# Patient Record
Sex: FEMALE | Race: WHITE | NOT HISPANIC OR LATINO
[De-identification: names, ages, dates, MRNs, and addresses within clinical notes are randomized per-mention and may not be internally consistent; named-entity substitution may affect disease eponyms.]

---

## 2017-04-18 ENCOUNTER — APPOINTMENT (OUTPATIENT)
Dept: SURGICAL ONCOLOGY | Facility: CLINIC | Age: 77
End: 2017-04-18

## 2017-04-18 VITALS
HEIGHT: 60 IN | OXYGEN SATURATION: 98 % | SYSTOLIC BLOOD PRESSURE: 189 MMHG | WEIGHT: 147 LBS | HEART RATE: 66 BPM | BODY MASS INDEX: 28.86 KG/M2 | DIASTOLIC BLOOD PRESSURE: 92 MMHG

## 2017-04-18 DIAGNOSIS — C50.911 MALIGNANT NEOPLASM OF UNSPECIFIED SITE OF RIGHT FEMALE BREAST: ICD-10-CM

## 2018-04-17 ENCOUNTER — APPOINTMENT (OUTPATIENT)
Dept: SURGICAL ONCOLOGY | Facility: CLINIC | Age: 78
End: 2018-04-17

## 2018-04-18 ENCOUNTER — RESULT REVIEW (OUTPATIENT)
Age: 78
End: 2018-04-18

## 2019-03-25 ENCOUNTER — RX RENEWAL (OUTPATIENT)
Age: 79
End: 2019-03-25

## 2019-03-25 DIAGNOSIS — E03.9 HYPOTHYROIDISM, UNSPECIFIED: ICD-10-CM

## 2019-03-26 ENCOUNTER — RX RENEWAL (OUTPATIENT)
Age: 79
End: 2019-03-26

## 2019-06-17 ENCOUNTER — APPOINTMENT (OUTPATIENT)
Dept: CARDIOLOGY | Facility: CLINIC | Age: 79
End: 2019-06-17

## 2019-06-28 ENCOUNTER — RX RENEWAL (OUTPATIENT)
Age: 79
End: 2019-06-28

## 2019-11-19 ENCOUNTER — EMERGENCY (EMERGENCY)
Facility: HOSPITAL | Age: 79
LOS: 1 days | Discharge: HOME | End: 2019-11-19
Attending: EMERGENCY MEDICINE
Payer: MEDICARE

## 2019-11-19 VITALS
OXYGEN SATURATION: 99 % | HEART RATE: 74 BPM | SYSTOLIC BLOOD PRESSURE: 174 MMHG | DIASTOLIC BLOOD PRESSURE: 80 MMHG | RESPIRATION RATE: 16 BRPM | HEIGHT: 60 IN | TEMPERATURE: 98 F | WEIGHT: 145.06 LBS

## 2019-11-19 VITALS
SYSTOLIC BLOOD PRESSURE: 182 MMHG | HEART RATE: 57 BPM | RESPIRATION RATE: 18 BRPM | TEMPERATURE: 98 F | DIASTOLIC BLOOD PRESSURE: 76 MMHG | OXYGEN SATURATION: 99 %

## 2019-11-19 LAB
ALBUMIN SERPL ELPH-MCNC: 4.2 G/DL — SIGNIFICANT CHANGE UP (ref 3.3–5)
ALP SERPL-CCNC: 83 U/L — SIGNIFICANT CHANGE UP (ref 40–120)
ALT FLD-CCNC: 30 U/L — SIGNIFICANT CHANGE UP (ref 10–45)
ANION GAP SERPL CALC-SCNC: 12 MMOL/L — SIGNIFICANT CHANGE UP (ref 5–17)
AST SERPL-CCNC: 29 U/L — SIGNIFICANT CHANGE UP (ref 10–40)
BASOPHILS # BLD AUTO: 0.03 K/UL — SIGNIFICANT CHANGE UP (ref 0–0.2)
BASOPHILS NFR BLD AUTO: 0.4 % — SIGNIFICANT CHANGE UP (ref 0–2)
BILIRUB SERPL-MCNC: 0.4 MG/DL — SIGNIFICANT CHANGE UP (ref 0.2–1.2)
BUN SERPL-MCNC: 9 MG/DL — SIGNIFICANT CHANGE UP (ref 7–23)
CALCIUM SERPL-MCNC: 9.5 MG/DL — SIGNIFICANT CHANGE UP (ref 8.4–10.5)
CHLORIDE SERPL-SCNC: 97 MMOL/L — SIGNIFICANT CHANGE UP (ref 96–108)
CO2 SERPL-SCNC: 24 MMOL/L — SIGNIFICANT CHANGE UP (ref 22–31)
CREAT SERPL-MCNC: 0.7 MG/DL — SIGNIFICANT CHANGE UP (ref 0.5–1.3)
EOSINOPHIL # BLD AUTO: 0.06 K/UL — SIGNIFICANT CHANGE UP (ref 0–0.5)
EOSINOPHIL NFR BLD AUTO: 0.8 % — SIGNIFICANT CHANGE UP (ref 0–6)
GLUCOSE SERPL-MCNC: 139 MG/DL — HIGH (ref 70–99)
HCT VFR BLD CALC: 40.6 % — SIGNIFICANT CHANGE UP (ref 34.5–45)
HGB BLD-MCNC: 14.1 G/DL — SIGNIFICANT CHANGE UP (ref 11.5–15.5)
IMM GRANULOCYTES NFR BLD AUTO: 0.3 % — SIGNIFICANT CHANGE UP (ref 0–1.5)
LYMPHOCYTES # BLD AUTO: 1.37 K/UL — SIGNIFICANT CHANGE UP (ref 1–3.3)
LYMPHOCYTES # BLD AUTO: 18.2 % — SIGNIFICANT CHANGE UP (ref 13–44)
MCHC RBC-ENTMCNC: 31.6 PG — SIGNIFICANT CHANGE UP (ref 27–34)
MCHC RBC-ENTMCNC: 34.7 GM/DL — SIGNIFICANT CHANGE UP (ref 32–36)
MCV RBC AUTO: 91 FL — SIGNIFICANT CHANGE UP (ref 80–100)
MONOCYTES # BLD AUTO: 0.63 K/UL — SIGNIFICANT CHANGE UP (ref 0–0.9)
MONOCYTES NFR BLD AUTO: 8.4 % — SIGNIFICANT CHANGE UP (ref 2–14)
NEUTROPHILS # BLD AUTO: 5.43 K/UL — SIGNIFICANT CHANGE UP (ref 1.8–7.4)
NEUTROPHILS NFR BLD AUTO: 71.9 % — SIGNIFICANT CHANGE UP (ref 43–77)
NRBC # BLD: 0 /100 WBCS — SIGNIFICANT CHANGE UP (ref 0–0)
PLATELET # BLD AUTO: 382 K/UL — SIGNIFICANT CHANGE UP (ref 150–400)
POTASSIUM SERPL-MCNC: 4.4 MMOL/L — SIGNIFICANT CHANGE UP (ref 3.5–5.3)
POTASSIUM SERPL-SCNC: 4.4 MMOL/L — SIGNIFICANT CHANGE UP (ref 3.5–5.3)
PROT SERPL-MCNC: 7.3 G/DL — SIGNIFICANT CHANGE UP (ref 6–8.3)
RBC # BLD: 4.46 M/UL — SIGNIFICANT CHANGE UP (ref 3.8–5.2)
RBC # FLD: 12.6 % — SIGNIFICANT CHANGE UP (ref 10.3–14.5)
SODIUM SERPL-SCNC: 133 MMOL/L — LOW (ref 135–145)
TROPONIN T, HIGH SENSITIVITY RESULT: <6 NG/L — SIGNIFICANT CHANGE UP (ref 0–51)
WBC # BLD: 7.54 K/UL — SIGNIFICANT CHANGE UP (ref 3.8–10.5)
WBC # FLD AUTO: 7.54 K/UL — SIGNIFICANT CHANGE UP (ref 3.8–10.5)

## 2019-11-19 PROCEDURE — 99285 EMERGENCY DEPT VISIT HI MDM: CPT

## 2019-11-19 PROCEDURE — 99283 EMERGENCY DEPT VISIT LOW MDM: CPT | Mod: 25

## 2019-11-19 PROCEDURE — 93005 ELECTROCARDIOGRAM TRACING: CPT

## 2019-11-19 PROCEDURE — 80053 COMPREHEN METABOLIC PANEL: CPT

## 2019-11-19 PROCEDURE — 84484 ASSAY OF TROPONIN QUANT: CPT

## 2019-11-19 PROCEDURE — 71046 X-RAY EXAM CHEST 2 VIEWS: CPT

## 2019-11-19 PROCEDURE — 85027 COMPLETE CBC AUTOMATED: CPT

## 2019-11-19 PROCEDURE — 82962 GLUCOSE BLOOD TEST: CPT

## 2019-11-19 PROCEDURE — 71046 X-RAY EXAM CHEST 2 VIEWS: CPT | Mod: 26

## 2019-11-19 RX ORDER — DEXAMETHASONE 0.5 MG/5ML
10 ELIXIR ORAL ONCE
Refills: 0 | Status: DISCONTINUED | OUTPATIENT
Start: 2019-11-19 | End: 2019-11-19

## 2019-11-19 RX ORDER — LIDOCAINE 4 G/100G
10 CREAM TOPICAL ONCE
Refills: 0 | Status: COMPLETED | OUTPATIENT
Start: 2019-11-19 | End: 2019-11-19

## 2019-11-19 RX ORDER — DEXAMETHASONE 0.5 MG/5ML
10 ELIXIR ORAL ONCE
Refills: 0 | Status: COMPLETED | OUTPATIENT
Start: 2019-11-19 | End: 2019-11-19

## 2019-11-19 RX ADMIN — LIDOCAINE 10 MILLILITER(S): 4 CREAM TOPICAL at 19:39

## 2019-11-19 RX ADMIN — Medication 30 MILLILITER(S): at 19:39

## 2019-11-19 RX ADMIN — Medication 10 MILLIGRAM(S): at 19:26

## 2019-11-19 NOTE — ED PROVIDER NOTE - ATTENDING CONTRIBUTION TO CARE
Pt with reports of pain of throat when swallowing, able to swallow secretions, no dyspnea, fever, vomiting.  NO stridor, no pharyngitis, clear lungs.

## 2019-11-19 NOTE — ED PROVIDER NOTE - NSFOLLOWUPINSTRUCTIONS_ED_ALL_ED_FT
-Follow-up with your Primary Care Doctor in 1-2 days.  -Return to the Emergency Department for any worsening or concerning symptoms such as fevers, severe pain, trouble breathing, weakness or lightheadedness. -Follow-up with your Primary Care Doctor in 1-2 days.  -Return to the Emergency Department for any worsening or concerning symptoms such as fevers, severe pain, trouble breathing, weakness or lightheadedness.    1. Recommend start Maalox 30ml 3-4 times a day only as needed for discomfort of throat/esophagus/stomach.  2. Pepcid 20mg twice a day for at least one month to reduce stomach acid and reflux.  3. Call GI tomorrow for appointment to consider endoscopy for your symptoms of pain of upper esophagus worse with swallowing.  4. Return if vomiting, worsening pain or other concerns.

## 2019-11-19 NOTE — ED PROVIDER NOTE - PHYSICAL EXAMINATION
Gen: No acute distress, alert, cooperative  HENT: Normocephalic, atraumatic. mild erythema posterior throat with no exudate swelling or collections. Ears clear, TMSnormal.   Eyes: PERRL, EOMI    Resp: CTAB, non-labored, no wheeze  CV: rrr, no murmur  Abd: soft, NTND, no rebound or guarding  MSK: No CVAT bilaterally, no midline ttp  Skin: warm and dry  Neuro: AOx3, speech is fluent and appropriate, no focal deficits  Psych: Normal affect

## 2019-11-19 NOTE — ED ADULT NURSE NOTE - OBJECTIVE STATEMENT
79 y.o. Female presents to the ED accompanied by c/o SOB since 6am this morning. Hx anxiety, hypothyroidism. A&Ox3. Ambulatory. Pt states feeling difficulty swallowing. Airway intact. Verbalizing clearly. Denies cough. Pt reports family member recently bronchitis and it started out with a sore throat as well. Lungs clear b/l. Denies CP, SOB, N/V/D, urinary/bowel complications, fever/chills. Well-appearing. Pt is in no current distress. Comfort and safety provided. Will continue to monitor. Dr. Mackenzie at bedside for assessment.

## 2019-11-19 NOTE — ED PROVIDER NOTE - PATIENT PORTAL LINK FT
You can access the FollowMyHealth Patient Portal offered by [facility] by registering at the following website: [email or web address]. By joining [facility]’s FollowMyHealth portal, you will also be able to view your health information using other applications (apps) compatible with our system.

## 2019-11-19 NOTE — ED ADULT NURSE REASSESSMENT NOTE - NS ED NURSE REASSESS COMMENT FT1
Patient received from HARRIS Burnette. Patient a&ox3, no acute distress, resp nonlabored, resting comfortably in bed with family at bedside. Denies headache, dizziness, chest pain, palpitations, SOB, abd pain, N/V/D, urinary symptoms, fevers, chills, weakness at this time. Patient awaiting discharge. Pt placed in position of comfort. Pt educated on call bell system and provided call bell. Non-skid socks on, bed in lowest position, wheels locked, appropriate side rails raised. Pt denies needs at this time.

## 2019-11-19 NOTE — ED PROVIDER NOTE - CLINICAL SUMMARY MEDICAL DECISION MAKING FREE TEXT BOX
78yo F hx hypothyroid, anxiety, asthma(rescue inhaler) presenting with sore throat since this morning when she woke up. Mild erythema posterior throat with some mild chest discomfort. Suspect viral infection.

## 2019-11-19 NOTE — ED PROVIDER NOTE - RAPID ASSESSMENT
**Pt seen in waiting room by Mana Jenkins ([id number]), documentation completed by Bob Rosenthal. Pt to be sent to main ED for further evaluation - all orders placed to be followed by MD in the main ED**     79 year old female with pmhx hypothyroidism, anxiety, asthma and pshx , s/p bunionectomy presents to the ED c/o throat pain this morning. +chest discomfort, throat pain, chills. Called PMD afterwards and was subsequently referred to [facility] ED for evaluation. Denies fever, cough, wheezing, SOB, lower extremity swelling. Denies recent travel. Denies smoking. Pt has had this type of discomfort once before and was seen at [facility] ED, diagnosed with pharyngitis and given Decadron. Notes that her daughter has been sick recently with similar symptoms. Took medicine for anxiety this morning at 0600 but experienced dizziness afterwards and followed it up with Meclizine. Currently on Levothyroxine.

## 2019-11-19 NOTE — ED PROVIDER NOTE - PROGRESS NOTE DETAILS
Discussed return precautions Dr. Savage Note: pt with obvious proximal esophageal discomfort, has GI doc, spoke to pt and daughter about f/u GI for endoscopy and start maalox and pepcid at home.

## 2019-11-19 NOTE — ED PROVIDER NOTE - OBJECTIVE STATEMENT
80yo F hx hypothyroid, anxiety, asthma(rescue inhaler) presenting with sore throat since this morning when she woke up. Says it is mildly painful when she swallows and feels like she doesn't have enough secretions. No difficulty swallowing, still eating and drinking without issue. Denies sob, back pain, headache, abdominal pain, congestion, runny nose. Notes some mild chest discomfort which has resolved. + sick contacts at home.

## 2019-12-18 ENCOUNTER — RX RENEWAL (OUTPATIENT)
Age: 79
End: 2019-12-18

## 2020-02-17 ENCOUNTER — RX RENEWAL (OUTPATIENT)
Age: 80
End: 2020-02-17

## 2020-02-17 RX ORDER — LEVOTHYROXINE SODIUM 0.05 MG/1
50 TABLET ORAL
Qty: 45 | Refills: 0 | Status: ACTIVE | COMMUNITY
Start: 2019-03-26 | End: 1900-01-01

## 2020-02-17 RX ORDER — LEVOTHYROXINE SODIUM 0.07 MG/1
75 TABLET ORAL
Qty: 45 | Refills: 0 | Status: ACTIVE | COMMUNITY
Start: 2019-03-26 | End: 1900-01-01

## 2021-07-20 ENCOUNTER — EMERGENCY (EMERGENCY)
Facility: HOSPITAL | Age: 81
LOS: 1 days | Discharge: HOME | End: 2021-07-20
Attending: EMERGENCY MEDICINE
Payer: MEDICARE

## 2021-07-20 VITALS
SYSTOLIC BLOOD PRESSURE: 150 MMHG | HEIGHT: 60 IN | DIASTOLIC BLOOD PRESSURE: 80 MMHG | RESPIRATION RATE: 17 BRPM | OXYGEN SATURATION: 99 % | HEART RATE: 61 BPM | WEIGHT: 145.06 LBS | TEMPERATURE: 98 F

## 2021-07-20 VITALS
DIASTOLIC BLOOD PRESSURE: 79 MMHG | SYSTOLIC BLOOD PRESSURE: 181 MMHG | OXYGEN SATURATION: 96 % | HEART RATE: 65 BPM | TEMPERATURE: 98 F | RESPIRATION RATE: 18 BRPM

## 2021-07-20 LAB
ALBUMIN SERPL ELPH-MCNC: 4.4 G/DL — SIGNIFICANT CHANGE UP (ref 3.3–5)
ALP SERPL-CCNC: 85 U/L — SIGNIFICANT CHANGE UP (ref 40–120)
ALT FLD-CCNC: 30 U/L — SIGNIFICANT CHANGE UP (ref 10–45)
ANION GAP SERPL CALC-SCNC: 13 MMOL/L — SIGNIFICANT CHANGE UP (ref 5–17)
APPEARANCE UR: ABNORMAL
AST SERPL-CCNC: 24 U/L — SIGNIFICANT CHANGE UP (ref 10–40)
BASOPHILS # BLD AUTO: 0.03 K/UL — SIGNIFICANT CHANGE UP (ref 0–0.2)
BASOPHILS NFR BLD AUTO: 0.3 % — SIGNIFICANT CHANGE UP (ref 0–2)
BILIRUB SERPL-MCNC: 0.4 MG/DL — SIGNIFICANT CHANGE UP (ref 0.2–1.2)
BILIRUB UR-MCNC: NEGATIVE — SIGNIFICANT CHANGE UP
BUN SERPL-MCNC: 10 MG/DL — SIGNIFICANT CHANGE UP (ref 7–23)
CALCIUM SERPL-MCNC: 9.2 MG/DL — SIGNIFICANT CHANGE UP (ref 8.4–10.5)
CHLORIDE SERPL-SCNC: 96 MMOL/L — SIGNIFICANT CHANGE UP (ref 96–108)
CO2 SERPL-SCNC: 23 MMOL/L — SIGNIFICANT CHANGE UP (ref 22–31)
COLOR SPEC: YELLOW — SIGNIFICANT CHANGE UP
CREAT SERPL-MCNC: 0.82 MG/DL — SIGNIFICANT CHANGE UP (ref 0.5–1.3)
DIFF PNL FLD: NEGATIVE — SIGNIFICANT CHANGE UP
EOSINOPHIL # BLD AUTO: 0.05 K/UL — SIGNIFICANT CHANGE UP (ref 0–0.5)
EOSINOPHIL NFR BLD AUTO: 0.5 % — SIGNIFICANT CHANGE UP (ref 0–6)
GLUCOSE SERPL-MCNC: 128 MG/DL — HIGH (ref 70–99)
GLUCOSE UR QL: NEGATIVE — SIGNIFICANT CHANGE UP
HCT VFR BLD CALC: 41.9 % — SIGNIFICANT CHANGE UP (ref 34.5–45)
HGB BLD-MCNC: 14.3 G/DL — SIGNIFICANT CHANGE UP (ref 11.5–15.5)
IMM GRANULOCYTES NFR BLD AUTO: 0.5 % — SIGNIFICANT CHANGE UP (ref 0–1.5)
KETONES UR-MCNC: NEGATIVE — SIGNIFICANT CHANGE UP
LEUKOCYTE ESTERASE UR-ACNC: ABNORMAL
LIDOCAIN IGE QN: 40 U/L — SIGNIFICANT CHANGE UP (ref 7–60)
LYMPHOCYTES # BLD AUTO: 1 K/UL — SIGNIFICANT CHANGE UP (ref 1–3.3)
LYMPHOCYTES # BLD AUTO: 9.1 % — LOW (ref 13–44)
MCHC RBC-ENTMCNC: 31.2 PG — SIGNIFICANT CHANGE UP (ref 27–34)
MCHC RBC-ENTMCNC: 34.1 GM/DL — SIGNIFICANT CHANGE UP (ref 32–36)
MCV RBC AUTO: 91.3 FL — SIGNIFICANT CHANGE UP (ref 80–100)
MONOCYTES # BLD AUTO: 0.83 K/UL — SIGNIFICANT CHANGE UP (ref 0–0.9)
MONOCYTES NFR BLD AUTO: 7.5 % — SIGNIFICANT CHANGE UP (ref 2–14)
NEUTROPHILS # BLD AUTO: 9.07 K/UL — HIGH (ref 1.8–7.4)
NEUTROPHILS NFR BLD AUTO: 82.1 % — HIGH (ref 43–77)
NITRITE UR-MCNC: NEGATIVE — SIGNIFICANT CHANGE UP
NRBC # BLD: 0 /100 WBCS — SIGNIFICANT CHANGE UP (ref 0–0)
PH UR: 6 — SIGNIFICANT CHANGE UP (ref 5–8)
PLATELET # BLD AUTO: 400 K/UL — SIGNIFICANT CHANGE UP (ref 150–400)
POTASSIUM SERPL-MCNC: 4 MMOL/L — SIGNIFICANT CHANGE UP (ref 3.5–5.3)
POTASSIUM SERPL-SCNC: 4 MMOL/L — SIGNIFICANT CHANGE UP (ref 3.5–5.3)
PROT SERPL-MCNC: 7.2 G/DL — SIGNIFICANT CHANGE UP (ref 6–8.3)
PROT UR-MCNC: ABNORMAL
RBC # BLD: 4.59 M/UL — SIGNIFICANT CHANGE UP (ref 3.8–5.2)
RBC # FLD: 12 % — SIGNIFICANT CHANGE UP (ref 10.3–14.5)
SODIUM SERPL-SCNC: 132 MMOL/L — LOW (ref 135–145)
SP GR SPEC: 1.02 — SIGNIFICANT CHANGE UP (ref 1.01–1.02)
UROBILINOGEN FLD QL: NEGATIVE — SIGNIFICANT CHANGE UP
WBC # BLD: 11.03 K/UL — HIGH (ref 3.8–10.5)
WBC # FLD AUTO: 11.03 K/UL — HIGH (ref 3.8–10.5)

## 2021-07-20 PROCEDURE — 99285 EMERGENCY DEPT VISIT HI MDM: CPT

## 2021-07-20 PROCEDURE — 96374 THER/PROPH/DIAG INJ IV PUSH: CPT | Mod: XU

## 2021-07-20 PROCEDURE — 74177 CT ABD & PELVIS W/CONTRAST: CPT | Mod: 26,MA

## 2021-07-20 PROCEDURE — 74177 CT ABD & PELVIS W/CONTRAST: CPT

## 2021-07-20 PROCEDURE — 83690 ASSAY OF LIPASE: CPT

## 2021-07-20 PROCEDURE — 85025 COMPLETE CBC W/AUTO DIFF WBC: CPT

## 2021-07-20 PROCEDURE — 99284 EMERGENCY DEPT VISIT MOD MDM: CPT | Mod: 25

## 2021-07-20 PROCEDURE — 81001 URINALYSIS AUTO W/SCOPE: CPT

## 2021-07-20 PROCEDURE — 70450 CT HEAD/BRAIN W/O DYE: CPT | Mod: 26,MA

## 2021-07-20 PROCEDURE — 80053 COMPREHEN METABOLIC PANEL: CPT

## 2021-07-20 PROCEDURE — 70450 CT HEAD/BRAIN W/O DYE: CPT

## 2021-07-20 RX ORDER — ACETAMINOPHEN 500 MG
650 TABLET ORAL ONCE
Refills: 0 | Status: COMPLETED | OUTPATIENT
Start: 2021-07-20 | End: 2021-07-20

## 2021-07-20 RX ORDER — CEFUROXIME AXETIL 250 MG
500 TABLET ORAL ONCE
Refills: 0 | Status: COMPLETED | OUTPATIENT
Start: 2021-07-20 | End: 2021-07-20

## 2021-07-20 RX ORDER — CEFUROXIME AXETIL 250 MG
1 TABLET ORAL
Qty: 10 | Refills: 0
Start: 2021-07-20 | End: 2021-07-24

## 2021-07-20 RX ORDER — ONDANSETRON 8 MG/1
4 TABLET, FILM COATED ORAL ONCE
Refills: 0 | Status: COMPLETED | OUTPATIENT
Start: 2021-07-20 | End: 2021-07-20

## 2021-07-20 RX ADMIN — Medication 500 MILLIGRAM(S): at 20:34

## 2021-07-20 RX ADMIN — ONDANSETRON 4 MILLIGRAM(S): 8 TABLET, FILM COATED ORAL at 17:50

## 2021-07-20 RX ADMIN — Medication 650 MILLIGRAM(S): at 21:30

## 2021-07-20 NOTE — ED PROVIDER NOTE - PATIENT PORTAL LINK FT
You can access the FollowMyHealth Patient Portal offered by [facility] by registering at the following website: [email or web address]. By joining [employer]’s FollowMyHealth portal, you will also be able to view your health information using other applications (apps) compatible with our system.

## 2021-07-20 NOTE — ED ADULT TRIAGE NOTE - CHIEF COMPLAINT QUOTE
trip and fall , missed a step off the curb . fell onto back of head. no LOC, no blood thinners. pt is aaox4.

## 2021-07-20 NOTE — ED ADULT NURSE NOTE - OBJECTIVE STATEMENT
81y f pt arrived via ems; emt reports pt shopping went to step up, grab a sign and fell backwards; hit back of head; pt aox3; no resp distress; no loc; no vision changes; pt usually ambulates without assist with steady gait; pt also c/o abd pain and distention; states went to md and was scheduled for ct scan of abd on monday; has plenty of gas but no bm; pt has taken 2 doses of miralax with no relief; pt abd + distended; nontender; + nausea; no vomiting; lauren (emt) at bedside; safety/comfort maintained

## 2021-07-20 NOTE — ED PROVIDER NOTE - PHYSICAL EXAMINATION
Gen - NAD; well-appearing; A+Ox3   HEENT - +hematoma to R posterior scalp without skin breakdown, +TTP; EOMI  Neck - supple, no c-spine tenderness, FROM  Resp - CTAB  CV -  RRR  Abd - soft, +distension, no appreciable tenderness; no guarding or rebound  Back - no midline tenderness  MSK - 5/5 strength and FROM b/l UE and LE  Extrem - no LE edema/erythema/tenderness  Neuro - no focal motor or sensation deficits

## 2021-07-20 NOTE — ED PROVIDER NOTE - CLINICAL SUMMARY MEDICAL DECISION MAKING FREE TEXT BOX
Attending MD Matson:  81F presenting for evaluation of posterior head pain after fall from standing backwards, no LOC. Cervical spine cleared clinically of fracture without need for imaging according to [facility] Criteria. Exam with right occipital scalp hematoma without obvious laceration. Nonfocal neuro exam. No other trauma on exam. Pt also endorses nausea and no BMs x days, abdomen distended but nontender, no focal ttp, hyperactive BS. Plan for CT a/p to r/o SBO, CT head to ro ICH       *The above represents an initial assessment/impression. Please refer to progress notes for potential changes in patient clinical course*

## 2021-07-20 NOTE — ED PROVIDER NOTE - CARE PLAN
Principal Discharge DX:	Constipation  Secondary Diagnosis:	Fall   Principal Discharge DX:	Constipation  Secondary Diagnosis:	UTI (urinary tract infection)  Secondary Diagnosis:	Closed head injury, initial encounter

## 2021-07-20 NOTE — ED PROVIDER NOTE - OBJECTIVE STATEMENT
81 year old female with history of HTN, hypothyroidism, GERD presenting s/p trip and fall with posterior head pain. States she was stepping up onto curb when the pole she hung onto fell backwards, causing her to fall back and hit the back of her head to ground, denies LOC/AC use, does report +pain to back of head with a "bump". Also reports that in past few days has had abdominal distension, was rx'd miralax by PMD which she used last night and this AM without improvement. Denies CP, SOB, vomiting, cough, weakness, numbness, bleeding.     Dr. Jade PMD

## 2021-07-20 NOTE — ED PROVIDER NOTE - NSFOLLOWUPINSTRUCTIONS_ED_ALL_ED_FT
You were seen in the Emergency Department for: fall, constipation    You were found to have a Urinary Tract Infection (UTI)    You were prescribed to the pharmacy: Ceftin (antibiotic)  Please follow the instructions on the container/label and ask your pharmacist for any questions/concerns.    Please also continue to take Miralax. You can purchase it over the counterl    For pain/fever, you may take Tylenol (acetaminophen) 650 mg every 6 hours, OR Advil (ibuprofen) 600 mg every 8 hours.    Please follow up with your primary physician as discussed. If you do not have a primary physician or specialist of your needs, please call [phone] to find one convenient for you. At this number you will be able to locate a provider who accepts your insurance, as well as locate the right specialist for your needs.    You should return to the Emergency Department if you feel any new/worsening/persistent symptoms including but not limited to: chest pain, difficulty breathing, loss of consciousness, bleeding, uncontrolled pain, numbness/weakness of a body part

## 2021-07-20 NOTE — ED PROVIDER NOTE - ATTENDING CONTRIBUTION TO CARE
Attending MD Matson:  I personally have seen and examined this patient.  Resident note reviewed and agree on plan of care and except where noted.  See HPI, PE, and MDM for details.

## 2021-07-29 ENCOUNTER — APPOINTMENT (OUTPATIENT)
Dept: GASTROENTEROLOGY | Facility: CLINIC | Age: 81
End: 2021-07-29

## 2022-10-01 ENCOUNTER — INPATIENT (INPATIENT)
Facility: HOSPITAL | Age: 82
LOS: 2 days | Discharge: HOME | DRG: 381 | End: 2022-10-04
Attending: HOSPITALIST | Admitting: HOSPITALIST
Payer: MEDICARE

## 2022-10-01 VITALS
TEMPERATURE: 97 F | SYSTOLIC BLOOD PRESSURE: 114 MMHG | OXYGEN SATURATION: 98 % | HEIGHT: 60 IN | DIASTOLIC BLOOD PRESSURE: 76 MMHG | HEART RATE: 83 BPM | WEIGHT: 149.91 LBS | RESPIRATION RATE: 20 BRPM

## 2022-10-01 DIAGNOSIS — K08.409 PARTIAL LOSS OF TEETH, UNSPECIFIED CAUSE, UNSPECIFIED CLASS: ICD-10-CM

## 2022-10-01 DIAGNOSIS — D72.829 ELEVATED WHITE BLOOD CELL COUNT, UNSPECIFIED: ICD-10-CM

## 2022-10-01 DIAGNOSIS — E78.5 HYPERLIPIDEMIA, UNSPECIFIED: ICD-10-CM

## 2022-10-01 DIAGNOSIS — Z29.9 ENCOUNTER FOR PROPHYLACTIC MEASURES, UNSPECIFIED: ICD-10-CM

## 2022-10-01 DIAGNOSIS — E03.9 HYPOTHYROIDISM, UNSPECIFIED: ICD-10-CM

## 2022-10-01 DIAGNOSIS — I10 ESSENTIAL (PRIMARY) HYPERTENSION: ICD-10-CM

## 2022-10-01 DIAGNOSIS — R13.10 DYSPHAGIA, UNSPECIFIED: ICD-10-CM

## 2022-10-01 DIAGNOSIS — E87.1 HYPO-OSMOLALITY AND HYPONATREMIA: ICD-10-CM

## 2022-10-01 DIAGNOSIS — R11.12 PROJECTILE VOMITING: ICD-10-CM

## 2022-10-01 LAB
ALBUMIN SERPL ELPH-MCNC: 4.1 G/DL — SIGNIFICANT CHANGE UP (ref 3.3–5)
ALP SERPL-CCNC: 81 U/L — SIGNIFICANT CHANGE UP (ref 40–120)
ALT FLD-CCNC: 42 U/L — SIGNIFICANT CHANGE UP (ref 10–45)
ANION GAP SERPL CALC-SCNC: 15 MMOL/L — SIGNIFICANT CHANGE UP (ref 5–17)
ANION GAP SERPL CALC-SCNC: 17 MMOL/L — SIGNIFICANT CHANGE UP (ref 5–17)
APPEARANCE UR: CLEAR — SIGNIFICANT CHANGE UP
AST SERPL-CCNC: 40 U/L — SIGNIFICANT CHANGE UP (ref 10–40)
BASOPHILS # BLD AUTO: 0.04 K/UL — SIGNIFICANT CHANGE UP (ref 0–0.2)
BASOPHILS NFR BLD AUTO: 0.3 % — SIGNIFICANT CHANGE UP (ref 0–2)
BILIRUB SERPL-MCNC: 0.6 MG/DL — SIGNIFICANT CHANGE UP (ref 0.2–1.2)
BILIRUB UR-MCNC: NEGATIVE — SIGNIFICANT CHANGE UP
BUN SERPL-MCNC: 10 MG/DL — SIGNIFICANT CHANGE UP (ref 7–23)
BUN SERPL-MCNC: 12 MG/DL — SIGNIFICANT CHANGE UP (ref 7–23)
CALCIUM SERPL-MCNC: 8.8 MG/DL — SIGNIFICANT CHANGE UP (ref 8.4–10.5)
CALCIUM SERPL-MCNC: 9.2 MG/DL — SIGNIFICANT CHANGE UP (ref 8.4–10.5)
CHLORIDE SERPL-SCNC: 91 MMOL/L — LOW (ref 96–108)
CHLORIDE SERPL-SCNC: 93 MMOL/L — LOW (ref 96–108)
CHLORIDE UR-SCNC: 117 MMOL/L — SIGNIFICANT CHANGE UP
CO2 SERPL-SCNC: 18 MMOL/L — LOW (ref 22–31)
CO2 SERPL-SCNC: 19 MMOL/L — LOW (ref 22–31)
COLOR SPEC: COLORLESS — SIGNIFICANT CHANGE UP
CREAT SERPL-MCNC: 0.69 MG/DL — SIGNIFICANT CHANGE UP (ref 0.5–1.3)
CREAT SERPL-MCNC: 0.7 MG/DL — SIGNIFICANT CHANGE UP (ref 0.5–1.3)
DIFF PNL FLD: NEGATIVE — SIGNIFICANT CHANGE UP
EGFR: 86 ML/MIN/1.73M2 — SIGNIFICANT CHANGE UP
EGFR: 87 ML/MIN/1.73M2 — SIGNIFICANT CHANGE UP
EOSINOPHIL # BLD AUTO: 0.06 K/UL — SIGNIFICANT CHANGE UP (ref 0–0.5)
EOSINOPHIL NFR BLD AUTO: 0.4 % — SIGNIFICANT CHANGE UP (ref 0–6)
FLUAV AG NPH QL: SIGNIFICANT CHANGE UP
FLUBV AG NPH QL: SIGNIFICANT CHANGE UP
GLUCOSE SERPL-MCNC: 169 MG/DL — HIGH (ref 70–99)
GLUCOSE SERPL-MCNC: 186 MG/DL — HIGH (ref 70–99)
GLUCOSE UR QL: ABNORMAL
HCT VFR BLD CALC: 41 % — SIGNIFICANT CHANGE UP (ref 34.5–45)
HCT VFR BLD CALC: 41.3 % — SIGNIFICANT CHANGE UP (ref 34.5–45)
HGB BLD-MCNC: 13.9 G/DL — SIGNIFICANT CHANGE UP (ref 11.5–15.5)
HGB BLD-MCNC: 14.2 G/DL — SIGNIFICANT CHANGE UP (ref 11.5–15.5)
IMM GRANULOCYTES NFR BLD AUTO: 0.4 % — SIGNIFICANT CHANGE UP (ref 0–0.9)
KETONES UR-MCNC: SIGNIFICANT CHANGE UP
LEUKOCYTE ESTERASE UR-ACNC: NEGATIVE — SIGNIFICANT CHANGE UP
LIDOCAIN IGE QN: 56 U/L — SIGNIFICANT CHANGE UP (ref 7–60)
LYMPHOCYTES # BLD AUTO: 1.63 K/UL — SIGNIFICANT CHANGE UP (ref 1–3.3)
LYMPHOCYTES # BLD AUTO: 10.7 % — LOW (ref 13–44)
MAGNESIUM SERPL-MCNC: 2 MG/DL — SIGNIFICANT CHANGE UP (ref 1.6–2.6)
MCHC RBC-ENTMCNC: 30.9 PG — SIGNIFICANT CHANGE UP (ref 27–34)
MCHC RBC-ENTMCNC: 31.1 PG — SIGNIFICANT CHANGE UP (ref 27–34)
MCHC RBC-ENTMCNC: 33.9 GM/DL — SIGNIFICANT CHANGE UP (ref 32–36)
MCHC RBC-ENTMCNC: 34.4 GM/DL — SIGNIFICANT CHANGE UP (ref 32–36)
MCV RBC AUTO: 90 FL — SIGNIFICANT CHANGE UP (ref 80–100)
MCV RBC AUTO: 91.7 FL — SIGNIFICANT CHANGE UP (ref 80–100)
MONOCYTES # BLD AUTO: 0.89 K/UL — SIGNIFICANT CHANGE UP (ref 0–0.9)
MONOCYTES NFR BLD AUTO: 5.8 % — SIGNIFICANT CHANGE UP (ref 2–14)
NEUTROPHILS # BLD AUTO: 12.6 K/UL — HIGH (ref 1.8–7.4)
NEUTROPHILS NFR BLD AUTO: 82.4 % — HIGH (ref 43–77)
NITRITE UR-MCNC: NEGATIVE — SIGNIFICANT CHANGE UP
NRBC # BLD: 0 /100 WBCS — SIGNIFICANT CHANGE UP (ref 0–0)
NRBC # BLD: 0 /100 WBCS — SIGNIFICANT CHANGE UP (ref 0–0)
OSMOLALITY SERPL: 267 MOSMOL/KG — LOW (ref 280–301)
PH UR: 8 — SIGNIFICANT CHANGE UP (ref 5–8)
PHOSPHATE SERPL-MCNC: 2.5 MG/DL — SIGNIFICANT CHANGE UP (ref 2.5–4.5)
PLATELET # BLD AUTO: 411 K/UL — HIGH (ref 150–400)
PLATELET # BLD AUTO: 424 K/UL — HIGH (ref 150–400)
POTASSIUM SERPL-MCNC: 4.1 MMOL/L — SIGNIFICANT CHANGE UP (ref 3.5–5.3)
POTASSIUM SERPL-MCNC: 4.6 MMOL/L — SIGNIFICANT CHANGE UP (ref 3.5–5.3)
POTASSIUM SERPL-SCNC: 4.1 MMOL/L — SIGNIFICANT CHANGE UP (ref 3.5–5.3)
POTASSIUM SERPL-SCNC: 4.6 MMOL/L — SIGNIFICANT CHANGE UP (ref 3.5–5.3)
PROT ?TM UR-MCNC: 8 MG/DL — SIGNIFICANT CHANGE UP (ref 0–12)
PROT SERPL-MCNC: 7 G/DL — SIGNIFICANT CHANGE UP (ref 6–8.3)
PROT UR-MCNC: NEGATIVE — SIGNIFICANT CHANGE UP
RBC # BLD: 4.47 M/UL — SIGNIFICANT CHANGE UP (ref 3.8–5.2)
RBC # BLD: 4.59 M/UL — SIGNIFICANT CHANGE UP (ref 3.8–5.2)
RBC # FLD: 12.1 % — SIGNIFICANT CHANGE UP (ref 10.3–14.5)
RBC # FLD: 12.2 % — SIGNIFICANT CHANGE UP (ref 10.3–14.5)
RSV RNA NPH QL NAA+NON-PROBE: SIGNIFICANT CHANGE UP
SARS-COV-2 RNA SPEC QL NAA+PROBE: SIGNIFICANT CHANGE UP
SODIUM SERPL-SCNC: 126 MMOL/L — LOW (ref 135–145)
SODIUM SERPL-SCNC: 127 MMOL/L — LOW (ref 135–145)
SODIUM UR-SCNC: 136 MMOL/L — SIGNIFICANT CHANGE UP
SP GR SPEC: 1.01 — SIGNIFICANT CHANGE UP (ref 1.01–1.02)
UROBILINOGEN FLD QL: NEGATIVE — SIGNIFICANT CHANGE UP
WBC # BLD: 11.98 K/UL — HIGH (ref 3.8–10.5)
WBC # BLD: 15.28 K/UL — HIGH (ref 3.8–10.5)
WBC # FLD AUTO: 11.98 K/UL — HIGH (ref 3.8–10.5)
WBC # FLD AUTO: 15.28 K/UL — HIGH (ref 3.8–10.5)

## 2022-10-01 PROCEDURE — 99285 EMERGENCY DEPT VISIT HI MDM: CPT | Mod: GC

## 2022-10-01 PROCEDURE — 99223 1ST HOSP IP/OBS HIGH 75: CPT

## 2022-10-01 PROCEDURE — 93010 ELECTROCARDIOGRAM REPORT: CPT

## 2022-10-01 RX ORDER — LOSARTAN POTASSIUM 100 MG/1
1 TABLET, FILM COATED ORAL
Qty: 0 | Refills: 0 | DISCHARGE

## 2022-10-01 RX ORDER — ATORVASTATIN CALCIUM 80 MG/1
10 TABLET, FILM COATED ORAL AT BEDTIME
Refills: 0 | Status: DISCONTINUED | OUTPATIENT
Start: 2022-10-01 | End: 2022-10-04

## 2022-10-01 RX ORDER — METOCLOPRAMIDE HCL 10 MG
10 TABLET ORAL ONCE
Refills: 0 | Status: COMPLETED | OUTPATIENT
Start: 2022-10-01 | End: 2022-10-01

## 2022-10-01 RX ORDER — LOSARTAN POTASSIUM 100 MG/1
50 TABLET, FILM COATED ORAL DAILY
Refills: 0 | Status: DISCONTINUED | OUTPATIENT
Start: 2022-10-01 | End: 2022-10-04

## 2022-10-01 RX ORDER — ONDANSETRON 8 MG/1
8 TABLET, FILM COATED ORAL ONCE
Refills: 0 | Status: COMPLETED | OUTPATIENT
Start: 2022-10-01 | End: 2022-10-01

## 2022-10-01 RX ORDER — ACETAMINOPHEN 500 MG
650 TABLET ORAL EVERY 6 HOURS
Refills: 0 | Status: DISCONTINUED | OUTPATIENT
Start: 2022-10-01 | End: 2022-10-04

## 2022-10-01 RX ORDER — CIPROFLOXACIN LACTATE 400MG/40ML
500 VIAL (ML) INTRAVENOUS EVERY 12 HOURS
Refills: 0 | Status: COMPLETED | OUTPATIENT
Start: 2022-10-01 | End: 2022-10-04

## 2022-10-01 RX ORDER — ONDANSETRON 8 MG/1
4 TABLET, FILM COATED ORAL EVERY 8 HOURS
Refills: 0 | Status: DISCONTINUED | OUTPATIENT
Start: 2022-10-01 | End: 2022-10-04

## 2022-10-01 RX ORDER — ONDANSETRON 8 MG/1
4 TABLET, FILM COATED ORAL ONCE
Refills: 0 | Status: COMPLETED | OUTPATIENT
Start: 2022-10-01 | End: 2022-10-01

## 2022-10-01 RX ORDER — LEVOTHYROXINE SODIUM 125 MCG
75 TABLET ORAL DAILY
Refills: 0 | Status: DISCONTINUED | OUTPATIENT
Start: 2022-10-01 | End: 2022-10-04

## 2022-10-01 RX ORDER — ENOXAPARIN SODIUM 100 MG/ML
40 INJECTION SUBCUTANEOUS EVERY 24 HOURS
Refills: 0 | Status: DISCONTINUED | OUTPATIENT
Start: 2022-10-01 | End: 2022-10-04

## 2022-10-01 RX ORDER — LANOLIN ALCOHOL/MO/W.PET/CERES
3 CREAM (GRAM) TOPICAL AT BEDTIME
Refills: 0 | Status: DISCONTINUED | OUTPATIENT
Start: 2022-10-01 | End: 2022-10-04

## 2022-10-01 RX ORDER — ATORVASTATIN CALCIUM 80 MG/1
1 TABLET, FILM COATED ORAL
Qty: 0 | Refills: 0 | DISCHARGE

## 2022-10-01 RX ORDER — SODIUM CHLORIDE 9 MG/ML
500 INJECTION INTRAMUSCULAR; INTRAVENOUS; SUBCUTANEOUS ONCE
Refills: 0 | Status: COMPLETED | OUTPATIENT
Start: 2022-10-01 | End: 2022-10-01

## 2022-10-01 RX ORDER — ACETAMINOPHEN 500 MG
1000 TABLET ORAL ONCE
Refills: 0 | Status: COMPLETED | OUTPATIENT
Start: 2022-10-01 | End: 2022-10-01

## 2022-10-01 RX ORDER — LEVOTHYROXINE SODIUM 125 MCG
1 TABLET ORAL
Qty: 0 | Refills: 0 | DISCHARGE

## 2022-10-01 RX ADMIN — Medication 650 MILLIGRAM(S): at 15:41

## 2022-10-01 RX ADMIN — Medication 10 MILLIGRAM(S): at 08:05

## 2022-10-01 RX ADMIN — Medication 400 MILLIGRAM(S): at 06:36

## 2022-10-01 RX ADMIN — Medication 75 MICROGRAM(S): at 11:18

## 2022-10-01 RX ADMIN — Medication 500 MILLIGRAM(S): at 21:49

## 2022-10-01 RX ADMIN — Medication 1000 MILLIGRAM(S): at 07:06

## 2022-10-01 RX ADMIN — ATORVASTATIN CALCIUM 10 MILLIGRAM(S): 80 TABLET, FILM COATED ORAL at 21:49

## 2022-10-01 RX ADMIN — Medication 650 MILLIGRAM(S): at 21:49

## 2022-10-01 RX ADMIN — Medication 650 MILLIGRAM(S): at 15:11

## 2022-10-01 RX ADMIN — Medication 650 MILLIGRAM(S): at 22:00

## 2022-10-01 RX ADMIN — ONDANSETRON 8 MILLIGRAM(S): 8 TABLET, FILM COATED ORAL at 01:15

## 2022-10-01 RX ADMIN — SODIUM CHLORIDE 1000 MILLILITER(S): 9 INJECTION INTRAMUSCULAR; INTRAVENOUS; SUBCUTANEOUS at 01:44

## 2022-10-01 RX ADMIN — LOSARTAN POTASSIUM 50 MILLIGRAM(S): 100 TABLET, FILM COATED ORAL at 11:18

## 2022-10-01 RX ADMIN — ONDANSETRON 4 MILLIGRAM(S): 8 TABLET, FILM COATED ORAL at 15:09

## 2022-10-01 RX ADMIN — Medication 3 MILLIGRAM(S): at 21:50

## 2022-10-01 RX ADMIN — ENOXAPARIN SODIUM 40 MILLIGRAM(S): 100 INJECTION SUBCUTANEOUS at 11:18

## 2022-10-01 RX ADMIN — ONDANSETRON 4 MILLIGRAM(S): 8 TABLET, FILM COATED ORAL at 06:36

## 2022-10-01 NOTE — H&P ADULT - HISTORY OF PRESENT ILLNESS
82F PMH HTN, hypothyroidism, HLD , anxiety, Asthma p/w difficulty swallowing x few hours. Pt reports having emergency dental extraction at 6pm. She was prescribed tylenol+codeine, and cipro 500mg BID. She took all the meds around 7pm after which she had some rice pudding. Around 11/midnight, she reports feeling dizzy, nauseous with 1 episode of small volume blood tinged emesis and abdominal cramping. She then noticed she was finding it difficult to swallow and her mouth felt dry. She called her grandson who then brought her to the hospital. Of note, she has tolerated cipro in the past given to her after eye surgery. She had a similar episode with dysphagia back in 2016 when she presented here and was treated with decadron in the ED and discharged with ENT f/u. Denied odynophagia, throat tightness,  SOB, wheezing, chest pain, rashes, diarrhea,

## 2022-10-01 NOTE — ED ADULT NURSE NOTE - CHIEF COMPLAINT QUOTE
Orders for renal CTA and BMP sent to [facility].   one tooth extraction done Friday at 1800; nausea, vomit and difficulty swallowing began after taking first dose of post-op Cipro

## 2022-10-01 NOTE — H&P ADULT - PROBLEM SELECTOR PLAN 4
-c/w losartan -suspect reactive in s/o n/v  -will hold off on abx for now and continue to monitor clinically

## 2022-10-01 NOTE — ED PROVIDER NOTE - NS ED ROS FT
General: no fever, chills  HENT: no nasal congestion, no sore throat  Eyes: no visual changes, no blurred vision  Neck: no neck pain  CV: denies chest pain, no palpitations  Resp: no difficulty breathing, no cough  Abdominal: +nausea, +vomiting, no diarrhea, no abdominal pain  MSK: no muscle aches, no leg pain, no leg swelling  Neuro: no headaches  Skin: no rashes

## 2022-10-01 NOTE — H&P ADULT - PROBLEM SELECTOR PLAN 2
-hypotonic hyponatremia appears to be chronic based on labs from prior admission  -will fluid restrict and monitor for improvement  -trend BMP q12  -f/u urine Na and Osm  -f/u TSH

## 2022-10-01 NOTE — H&P ADULT - NSHPPHYSICALEXAM_GEN_ALL_CORE
Vital Signs Last 24 Hrs  T(C): 36.7 (01 Oct 2022 05:32), Max: 36.7 (01 Oct 2022 05:32)  T(F): 98 (01 Oct 2022 05:32), Max: 98 (01 Oct 2022 05:32)  HR: 74 (01 Oct 2022 05:32) (64 - 83)  BP: 159/74 (01 Oct 2022 05:32) (114/76 - 166/64)  BP(mean): 95 (01 Oct 2022 01:15) (95 - 95)  RR: 18 (01 Oct 2022 05:32) (18 - 20)  SpO2: 97% (01 Oct 2022 05:32) (97% - 98%)    Parameters below as of 01 Oct 2022 05:32  Patient On (Oxygen Delivery Method): room air

## 2022-10-01 NOTE — H&P ADULT - PROBLEM SELECTOR PLAN 7
-Diet: pureed  -DVT ppx: Lovenox -pain control  -can continue with cipro as pt has tolerated drug in the past

## 2022-10-01 NOTE — ED PROVIDER NOTE - ATTENDING CONTRIBUTION TO CARE
Patient is a 82-year-old female with a chief complaint of nausea and vomiting starting around 12:00.  Of note, patient had a dental extraction today.  Patient was prescribed ciprofloxacin, Tylenol with codeine, and ibuprofen which she took around 8 PM.  Other than that patient's been of her normal state of health. Patient is a 82-year-old female with a chief complaint of nausea and vomiting starting around 12:00.  Of note, patient had a dental extraction today.  Patient was prescribed ciprofloxacin, Tylenol with codeine, and ibuprofen which she took around 8 PM.  Other than that patient's been of her normal state of health.  ncat  mildly dry mm  cooperative   will get iv, cbc, cmp, analgesia and antiemetic prn, fluids. Will follow up on labs, analgesia, imaging, reassess and disposition as clinically indicated.  *The above represents an initial assessment/impression. Please refer to my progress notes below for potential changes in patient clinical course*  Patient endorsed to Dr. Holt at the time of admission. Based on patient's history and physical exam, as well as the results of today's workup, I feel that patient warrants admission to the hospital for further workup/evaluation and continued management. I discussed the findings of today's workup with the patient and addressed the patient's questions and concerns. The patient was agreeable with admission. Our team spoke with the inpatient receiving team who accepted the patient for admission and subsequently took over the patient's care at the time of admission. The receiving team will follow up on pending labs, analgesia, any clinical imaging results, ancillary findings, reassess, and disposition as clinically indicated. Details of patient and plan conveyed to receiving physician team and conveyed back for understanding. There were no questions at this time about the patient's status, disposition, and plan. Patient's care to be taken over by receiving physician team at this time, all decisions regarding the progression of care will be made at their discretion.

## 2022-10-01 NOTE — ED PROVIDER NOTE - CLINICAL SUMMARY MEDICAL DECISION MAKING FREE TEXT BOX
83 y/o F w/ HTN, hypothyroidism, GERD p/w n/v s/p tooth extraction c/f electrolyte abnormality. Less likely infectious vs intraabd process given unremarkable PE. Labs, zofran. Reassess.

## 2022-10-01 NOTE — H&P ADULT - ASSESSMENT
82F PMH HTN, hypothyroidism, HLD , anxiety, Asthma p/w difficulty swallowing x few hours. Also a/w n/v and abdominal pain

## 2022-10-01 NOTE — ED ADULT NURSE NOTE - OBJECTIVE STATEMENT
83yo F with PMH hypothyroid, asthma, anxiety, presents to ED c/o nausea and vomiting. Pt states nausea/vomiting started around 1200, pt has about 4-5 episode, did notice some blood in emesis at home. Pt also notes having a tooth extraction done today and was started on antibiotics (Ciprofloxacin. Pt also took Tylenol with codeine and Motrin at 8pm as prescribed. Pt also endorses difficulty swallowing. Denies chest pain, SOB, drooling, weakness, lightheadedness, fevers/chills. A&Ox3. Strong peripheral pulses. Neurologically intact and follows commands. Breathing spontaneously, nonlabored, chest rise symmetrical. Lungs clear b/l with auscultation. Pt maintaining secretions at this time. Skin warm dry intact and normal for ethnicity. Skin warm dry intact and normal for ethnicity. Pt's son at bedside. Stretcher locked and in lowest position, appropriate side rails up. Pt instructed to notify RN if assistance is needed.

## 2022-10-01 NOTE — ED ADULT TRIAGE NOTE - CHIEF COMPLAINT QUOTE
one tooth extraction done Friday at 1800; nausea, vomit and difficulty swallowing began after taking first dose of post-op Cipro

## 2022-10-01 NOTE — ED PROVIDER NOTE - OBJECTIVE STATEMENT
83 y/o F w/ mhx of HTN, hypothyroidism, GERD presents w/ n/v and difficulty swallowing x few hours. Patient states that she had a tooth extraction and was dc on cipro, tylenol and oxy. She took the cipro subsequently becoming nauseous. Multiple episodes of nbnb n/v. Unable to tolerate PO. Describes diff swallowing as difficulty mobilizing muscle as opposed to pain. Patient has no other complaints.

## 2022-10-01 NOTE — PROGRESS NOTE ADULT - NSPROGADDITIONALINFOA_GEN_ALL_CORE
d/w acp    Jazmine Harmon MD  [facility] Division of Hospital Medicine  Available via MS Teams  Pager: [phone]

## 2022-10-01 NOTE — ED ADULT NURSE NOTE - NSIMPLEMENTINTERV_GEN_ALL_ED
Implemented All Fall Risk Interventions:  [place] to call system. Call bell, personal items and telephone within reach. Instruct patient to call for assistance. Room bathroom lighting operational. Non-slip footwear when patient is off stretcher. Physically safe environment: no spills, clutter or unnecessary equipment. Stretcher in lowest position, wheels locked, appropriate side rails in place. Provide visual cue, wrist band, yellow gown, etc. Monitor gait and stability. Monitor for mental status changes and reorient to person, place, and time. Review medications for side effects contributing to fall risk. Reinforce activity limits and safety measures with patient and family.

## 2022-10-01 NOTE — ED PROVIDER NOTE - PROGRESS NOTE DETAILS
[place] PGY4: Patient unable to tolerate PO, na 126, urine studies sent. Will admit to medicine for hyponatremia w/u.

## 2022-10-01 NOTE — H&P ADULT - NSHPLABSRESULTS_GEN_ALL_CORE
14.2   15.28 )-----------( 411      ( 01 Oct 2022 01:48 )             41.3       10-    127<L>  |  93<L>  |  10  ----------------------------<  186<H>  4.1   |  19<L>  |  0.69    Ca    8.8      01 Oct 2022 05:13  Phos  2.5     10-  Mg     2.0     10-01    TPro  7.0  /  Alb  4.1  /  TBili  0.6  /  DBili  x   /  AST  40  /  ALT  42  /  AlkPhos  81  10            LIVER FUNCTIONS - ( 01 Oct 2022 01:48 )  Alb: 4.1 g/dL / Pro: 7.0 g/dL / ALK PHOS: 81 U/L / ALT: 42 U/L / AST: 40 U/L / GGT: x                 Urinalysis Basic - ( 01 Oct 2022 04:36 )    Color: Colorless / Appearance: Clear / S.011 / pH: x  Gluc: x / Ketone: Trace  / Bili: Negative / Urobili: Negative   Blood: x / Protein: Negative / Nitrite: Negative   Leuk Esterase: Negative / RBC: x / WBC x   Sq Epi: x / Non Sq Epi: x / Bacteria: x        I have personally reviewed imaging  I have personally reviewed EKG

## 2022-10-01 NOTE — H&P ADULT - PROBLEM SELECTOR PLAN 1
-suspect sxs d/t medication intolerance vs anxiety  -pt was able to swallow water without any issues  -given that she has tolerated cipro in the past, do not think this is an allergic reaction to cipro. Could have been the combination with the other drugs while taking them on an empty stomach  -pt has improved since coming to the hospital without any intervention  -will continue to monitor  -will start on a pureed diet and can advance as tolerated  -pt can f/u with ENT as outpt -suspect sxs d/t medication intolerance vs anxiety  -pt was able to swallow water without any issues  -given that she has tolerated cipro in the past, do not think this is an allergic reaction to cipro. Could have been the combination with the other drugs while taking them on an empty stomach  -pt has improved since coming to the hospital without any intervention  -will continue to monitor  -will start on a pureed diet and can advance as tolerated  -pt can f/u with ENT as outpt  -c/w antiemetics prn

## 2022-10-02 ENCOUNTER — TRANSCRIPTION ENCOUNTER (OUTPATIENT)
Age: 82
End: 2022-10-02

## 2022-10-02 LAB
ANION GAP SERPL CALC-SCNC: 10 MMOL/L — SIGNIFICANT CHANGE UP (ref 5–17)
ANION GAP SERPL CALC-SCNC: 13 MMOL/L — SIGNIFICANT CHANGE UP (ref 5–17)
BUN SERPL-MCNC: 7 MG/DL — SIGNIFICANT CHANGE UP (ref 7–23)
BUN SERPL-MCNC: 7 MG/DL — SIGNIFICANT CHANGE UP (ref 7–23)
CALCIUM SERPL-MCNC: 8.9 MG/DL — SIGNIFICANT CHANGE UP (ref 8.4–10.5)
CALCIUM SERPL-MCNC: 8.9 MG/DL — SIGNIFICANT CHANGE UP (ref 8.4–10.5)
CHLORIDE SERPL-SCNC: 100 MMOL/L — SIGNIFICANT CHANGE UP (ref 96–108)
CHLORIDE SERPL-SCNC: 97 MMOL/L — SIGNIFICANT CHANGE UP (ref 96–108)
CO2 SERPL-SCNC: 24 MMOL/L — SIGNIFICANT CHANGE UP (ref 22–31)
CO2 SERPL-SCNC: 24 MMOL/L — SIGNIFICANT CHANGE UP (ref 22–31)
CREAT SERPL-MCNC: 0.85 MG/DL — SIGNIFICANT CHANGE UP (ref 0.5–1.3)
CREAT SERPL-MCNC: 0.86 MG/DL — SIGNIFICANT CHANGE UP (ref 0.5–1.3)
CULTURE RESULTS: SIGNIFICANT CHANGE UP
EGFR: 67 ML/MIN/1.73M2 — SIGNIFICANT CHANGE UP
EGFR: 68 ML/MIN/1.73M2 — SIGNIFICANT CHANGE UP
GLUCOSE SERPL-MCNC: 131 MG/DL — HIGH (ref 70–99)
GLUCOSE SERPL-MCNC: 140 MG/DL — HIGH (ref 70–99)
HCT VFR BLD CALC: 36.6 % — SIGNIFICANT CHANGE UP (ref 34.5–45)
HGB BLD-MCNC: 12.7 G/DL — SIGNIFICANT CHANGE UP (ref 11.5–15.5)
MAGNESIUM SERPL-MCNC: 2 MG/DL — SIGNIFICANT CHANGE UP (ref 1.6–2.6)
MAGNESIUM SERPL-MCNC: 2.2 MG/DL — SIGNIFICANT CHANGE UP (ref 1.6–2.6)
MCHC RBC-ENTMCNC: 31.5 PG — SIGNIFICANT CHANGE UP (ref 27–34)
MCHC RBC-ENTMCNC: 34.7 GM/DL — SIGNIFICANT CHANGE UP (ref 32–36)
MCV RBC AUTO: 90.8 FL — SIGNIFICANT CHANGE UP (ref 80–100)
NRBC # BLD: 0 /100 WBCS — SIGNIFICANT CHANGE UP (ref 0–0)
PHOSPHATE SERPL-MCNC: 2.6 MG/DL — SIGNIFICANT CHANGE UP (ref 2.5–4.5)
PLATELET # BLD AUTO: 358 K/UL — SIGNIFICANT CHANGE UP (ref 150–400)
POTASSIUM SERPL-MCNC: 4.2 MMOL/L — SIGNIFICANT CHANGE UP (ref 3.5–5.3)
POTASSIUM SERPL-MCNC: 4.2 MMOL/L — SIGNIFICANT CHANGE UP (ref 3.5–5.3)
POTASSIUM SERPL-SCNC: 4.2 MMOL/L — SIGNIFICANT CHANGE UP (ref 3.5–5.3)
POTASSIUM SERPL-SCNC: 4.2 MMOL/L — SIGNIFICANT CHANGE UP (ref 3.5–5.3)
RBC # BLD: 4.03 M/UL — SIGNIFICANT CHANGE UP (ref 3.8–5.2)
RBC # FLD: 12.4 % — SIGNIFICANT CHANGE UP (ref 10.3–14.5)
SODIUM SERPL-SCNC: 134 MMOL/L — LOW (ref 135–145)
SODIUM SERPL-SCNC: 134 MMOL/L — LOW (ref 135–145)
SPECIMEN SOURCE: SIGNIFICANT CHANGE UP
TSH SERPL-MCNC: 2.64 UIU/ML — SIGNIFICANT CHANGE UP (ref 0.27–4.2)
WBC # BLD: 9.29 K/UL — SIGNIFICANT CHANGE UP (ref 3.8–10.5)
WBC # FLD AUTO: 9.29 K/UL — SIGNIFICANT CHANGE UP (ref 3.8–10.5)

## 2022-10-02 PROCEDURE — 99232 SBSQ HOSP IP/OBS MODERATE 35: CPT | Mod: GC

## 2022-10-02 RX ORDER — SODIUM CHLORIDE 9 MG/ML
250 INJECTION, SOLUTION INTRAVENOUS
Refills: 0 | Status: COMPLETED | OUTPATIENT
Start: 2022-10-02 | End: 2022-10-02

## 2022-10-02 RX ORDER — ACETAMINOPHEN 500 MG
1000 TABLET ORAL ONCE
Refills: 0 | Status: COMPLETED | OUTPATIENT
Start: 2022-10-02 | End: 2022-10-02

## 2022-10-02 RX ADMIN — Medication 500 MILLIGRAM(S): at 18:35

## 2022-10-02 RX ADMIN — Medication 75 MICROGRAM(S): at 07:08

## 2022-10-02 RX ADMIN — Medication 400 MILLIGRAM(S): at 15:21

## 2022-10-02 RX ADMIN — Medication 500 MILLIGRAM(S): at 07:06

## 2022-10-02 RX ADMIN — Medication 3 MILLIGRAM(S): at 21:33

## 2022-10-02 RX ADMIN — Medication 650 MILLIGRAM(S): at 04:54

## 2022-10-02 RX ADMIN — ONDANSETRON 4 MILLIGRAM(S): 8 TABLET, FILM COATED ORAL at 22:12

## 2022-10-02 RX ADMIN — ATORVASTATIN CALCIUM 10 MILLIGRAM(S): 80 TABLET, FILM COATED ORAL at 21:32

## 2022-10-02 RX ADMIN — Medication 30 MILLILITER(S): at 22:11

## 2022-10-02 RX ADMIN — Medication 650 MILLIGRAM(S): at 04:21

## 2022-10-02 RX ADMIN — SODIUM CHLORIDE 125 MILLILITER(S): 9 INJECTION, SOLUTION INTRAVENOUS at 14:51

## 2022-10-02 RX ADMIN — LOSARTAN POTASSIUM 50 MILLIGRAM(S): 100 TABLET, FILM COATED ORAL at 07:08

## 2022-10-02 RX ADMIN — Medication 1000 MILLIGRAM(S): at 16:44

## 2022-10-02 NOTE — DISCHARGE NOTE PROVIDER - NSDCMRMEDTOKEN_GEN_ALL_CORE_FT
atorvastatin 10 mg oral tablet: 1 tab(s) orally once a day  Cipro 500 mg oral tablet: 1 tab(s) orally every 12 hours  losartan 50 mg oral tablet: 1 tab(s) orally once a day  Synthroid 75 mcg (0.075 mg) oral tablet: 1 tab(s) orally once a day   atorvastatin 10 mg oral tablet: 1 tab(s) orally once a day  losartan 50 mg oral tablet: 1 tab(s) orally once a day  pantoprazole 40 mg oral delayed release tablet: 1 tab(s) orally every 12 hours  Synthroid 75 mcg (0.075 mg) oral tablet: 1 tab(s) orally once a day

## 2022-10-02 NOTE — DISCHARGE NOTE PROVIDER - CARE PROVIDER_API CALL
MIRIAM ANDREA [place]au  Internal Medicine  [address] [place] [address]  Phone: [phone]  Fax: [phone]  Follow Up Time:    MIRIAM ANDREA Sheridan  Internal Medicine  [address] [place] [address]  Phone: [phone]  Fax: [phone]  Follow Up Time:     Andrea Chiang)  Gastroenterology; Internal Medicine  [address] [place] [address]  Phone: [phone]  Fax: [phone]  Follow Up Time: Routine

## 2022-10-02 NOTE — DISCHARGE NOTE PROVIDER - CARE PROVIDERS DIRECT ADDRESSES
,DirectAddress_Unknown ,DirectAddress_Unknown,[email or web address]

## 2022-10-02 NOTE — DISCHARGE NOTE PROVIDER - NSDCCPCAREPLAN_GEN_ALL_CORE_FT
PRINCIPAL DISCHARGE DIAGNOSIS  Diagnosis: Nausea & vomiting  Assessment and Plan of Treatment: Follow up with Dr. Pedraza this week      SECONDARY DISCHARGE DIAGNOSES  Diagnosis: Hyponatremia  Assessment and Plan of Treatment: Follow up with Dr. Pedraza for routine labs this week    Diagnosis: HTN (hypertension)  Assessment and Plan of Treatment: Follow up with your medical doctor to establish long term blood pressure treatment goals.      Diagnosis: Dysphagia  Assessment and Plan of Treatment: Continue bite sized easy to chew foods     PRINCIPAL DISCHARGE DIAGNOSIS  Diagnosis: Nausea & vomiting  Assessment and Plan of Treatment: Nausea possibly from Cipro . Remember to take antibiotic with food.   Follow up with Dr. Pedraza this week. If pain does not improve and reoccuring vomiting seek medical assistance      SECONDARY DISCHARGE DIAGNOSES  Diagnosis: Hyponatremia  Assessment and Plan of Treatment: Follow up with Dr. Pedraza for routine labs this week    Diagnosis: HTN (hypertension)  Assessment and Plan of Treatment: Follow up with your medical doctor to establish long term blood pressure treatment goals.      Diagnosis: Dysphagia  Assessment and Plan of Treatment: Continue bite sized easy to chew foods. If further difficulty swallowing follow with PMD    Diagnosis: Infected tooth  Assessment and Plan of Treatment: Continue cipro as directed by dentist, follow up as scheduled     PRINCIPAL DISCHARGE DIAGNOSIS  Diagnosis: Dysphagia  Assessment and Plan of Treatment: You had endoscopy showing esophageal ulcer and hiatal hernia.   We suspect your swallowing difficulties are due to these findings.   Please ensure you take protonix BID and follow up with biopsy result with GI team.   Continue bite sized easy to chew foods and plenty hydration      SECONDARY DISCHARGE DIAGNOSES  Diagnosis: Infected tooth  Assessment and Plan of Treatment: Please f/u with dentist

## 2022-10-02 NOTE — DISCHARGE NOTE PROVIDER - HOSPITAL COURSE
82F PMH HTN, hypothyroidism, HLD , anxiety, Asthma p/w difficulty swallowing x few hours. Also a/w n/v and abdominal pain     Problem/Plan - 1:  ·  Problem: Dysphagia.   ·  Plan: -suspect sxs d/t medication intolerance vs anxiety  -pt was able to swallow water without any issues  -given that she has tolerated cipro in the past, do not think this is an allergic reaction to cipro. Could have been the combination with the other drugs while taking them on an empty stomach  -pt has improved since coming to the hospital without any intervention  -will continue to monitor  -will start on a pureed diet and can advance as tolerated  -pt can f/u with ENT as outpt  -c/w antiemetics prn.     Problem/Plan - 2:  ·  Problem: Hyponatremia.   ·  Plan: -hypotonic hyponatremia appears to be chronic based on labs from prior admission  -trend BMP q12  -urine studies/serum osm consistent with SIADH, will fluid restrict and monitor.     Problem/Plan - 3:  ·  Problem: Hypothyroidism.   ·  Plan: -c/w synthroid  -f/u TSH.     Problem/Plan - 4:  ·  Problem: Leukocytosis.   ·  Plan: -suspect reactive in s/o n/v  -c/w cipro for 3 more days.     Problem/Plan - 5:  ·  Problem: HTN (hypertension).   ·  Plan: -c/w losartan.     Problem/Plan - 6:  ·  Problem: HLD (hyperlipidemia).   ·  Plan: -c/w statin.     Problem/Plan - 7:  ·  Problem: S/P tooth extraction.   ·  Plan: -pain control  -can continue with cipro as pt has tolerated drug in the past.     Problem/Plan - 8:  ·  Problem: Prophylactic measure.   ·  Plan: -Diet: pureed  -DVT ppx: Lovenox  -dispo: dc 10/2 if remains stable.   82F PMH HTN, hypothyroidism, HLD , anxiety, Asthma p/w difficulty swallowing x few hours. Also a/w n/v and abdominal pain, course complicated by persistent hyponatremia     Problem/Plan - 1:  ·  Problem: Dysphagia.   ·  Plan: Patient again c/o wallowing difficulty with breakfast and lunch today.   Given persistent symptom will need further GI and S+S eval.   -will continue to monitor  - on a regular diet but NPO past MN.    Problem/Plan - 2:  ·  Problem: Hyponatremia.   ·  Plan: -likely siadh, fluid restricted, now sodium  corrected.    Problem/Plan - 3:  ·  Problem: Hypothyroidism.   ·  Plan: -c/w synthroid.    Problem/Plan - 4:  ·  Problem: Leukocytosis.   ·  Plan: -suspect reactive in s/o n/v  -c/w cipro to completion.    Problem/Plan - 5:  ·  Problem: HTN (hypertension).   ·  Plan: -c/w losartan.    Problem/Plan - 6:  ·  Problem: HLD (hyperlipidemia).   ·  Plan: -c/w statin.    Problem/Plan - 7:  ·  Problem: S/P tooth extraction.   ·  Plan: -pain control - tylenol  -can continue with cipro as pt has tolerated drug in the past.    Problem/Plan - 8:  ·  Problem: Prophylactic measure.   ·  Plan: -Diet: pureed  -DVT ppx: Lovenox  -dispo: further GI eval.,   82F PMH HTN, hypothyroidism, HLD , anxiety, Asthma p/w difficulty swallowing x few hours. Also a/w n/v and abdominal pain, course complicated by persistent hyponatremia      Dysphagia.   ·  Plan:  Given persistent symptom will need further GI  eval- now s/p EGD with esophageal ulcer nonbleeding, hiatal hernia likely cause for swallowing difficulty.   - resuming diet and monitoring for tolerance.  - PPI BID  follow up biopsy with GI      Hyponatremia.   ·  Plan: -likely siadh, fluid restricted, now sodium  corrected.      S/P tooth extraction.   ·  Plan: -pain control - tylenol  -can continue with cipro as pt has tolerated drug in the past.     d/c home if tolerating diet. cleared by GI

## 2022-10-02 NOTE — DISCHARGE NOTE PROVIDER - PROVIDER TOKENS
PROVIDER:[TOKEN:[[id number]]] PROVIDER:[TOKEN:[[id number]]],PROVIDER:[TOKEN:[[id number]],FOLLOWUP:[Routine]]

## 2022-10-02 NOTE — PROGRESS NOTE ADULT - NSPROGADDITIONALINFOA_GEN_ALL_CORE
d/w acp    Jazmine Harmon MD  [facility] Division of Hospital Medicine  Available via MS Teams  Pager: [phone]

## 2022-10-03 LAB
ANION GAP SERPL CALC-SCNC: 13 MMOL/L — SIGNIFICANT CHANGE UP (ref 5–17)
BUN SERPL-MCNC: 11 MG/DL — SIGNIFICANT CHANGE UP (ref 7–23)
CALCIUM SERPL-MCNC: 8.7 MG/DL — SIGNIFICANT CHANGE UP (ref 8.4–10.5)
CHLORIDE SERPL-SCNC: 99 MMOL/L — SIGNIFICANT CHANGE UP (ref 96–108)
CO2 SERPL-SCNC: 24 MMOL/L — SIGNIFICANT CHANGE UP (ref 22–31)
CREAT SERPL-MCNC: 0.9 MG/DL — SIGNIFICANT CHANGE UP (ref 0.5–1.3)
EGFR: 64 ML/MIN/1.73M2 — SIGNIFICANT CHANGE UP
GLUCOSE SERPL-MCNC: 144 MG/DL — HIGH (ref 70–99)
MAGNESIUM SERPL-MCNC: 2.2 MG/DL — SIGNIFICANT CHANGE UP (ref 1.6–2.6)
PHOSPHATE SERPL-MCNC: 2.6 MG/DL — SIGNIFICANT CHANGE UP (ref 2.5–4.5)
POTASSIUM SERPL-MCNC: 4 MMOL/L — SIGNIFICANT CHANGE UP (ref 3.5–5.3)
POTASSIUM SERPL-SCNC: 4 MMOL/L — SIGNIFICANT CHANGE UP (ref 3.5–5.3)
SODIUM SERPL-SCNC: 136 MMOL/L — SIGNIFICANT CHANGE UP (ref 135–145)

## 2022-10-03 PROCEDURE — 99222 1ST HOSP IP/OBS MODERATE 55: CPT

## 2022-10-03 PROCEDURE — 99233 SBSQ HOSP IP/OBS HIGH 50: CPT

## 2022-10-03 RX ORDER — INFLUENZA VIRUS VACCINE 15; 15; 15; 15 UG/.5ML; UG/.5ML; UG/.5ML; UG/.5ML
0.7 SUSPENSION INTRAMUSCULAR ONCE
Refills: 0 | Status: DISCONTINUED | OUTPATIENT
Start: 2022-10-03 | End: 2022-10-04

## 2022-10-03 RX ORDER — PANTOPRAZOLE SODIUM 20 MG/1
40 TABLET, DELAYED RELEASE ORAL
Refills: 0 | Status: DISCONTINUED | OUTPATIENT
Start: 2022-10-03 | End: 2022-10-04

## 2022-10-03 RX ADMIN — ATORVASTATIN CALCIUM 10 MILLIGRAM(S): 80 TABLET, FILM COATED ORAL at 21:47

## 2022-10-03 RX ADMIN — Medication 650 MILLIGRAM(S): at 12:03

## 2022-10-03 RX ADMIN — Medication 650 MILLIGRAM(S): at 12:40

## 2022-10-03 RX ADMIN — ENOXAPARIN SODIUM 40 MILLIGRAM(S): 100 INJECTION SUBCUTANEOUS at 12:01

## 2022-10-03 RX ADMIN — Medication 650 MILLIGRAM(S): at 21:46

## 2022-10-03 RX ADMIN — Medication 30 MILLILITER(S): at 12:02

## 2022-10-03 RX ADMIN — Medication 500 MILLIGRAM(S): at 05:07

## 2022-10-03 RX ADMIN — LOSARTAN POTASSIUM 50 MILLIGRAM(S): 100 TABLET, FILM COATED ORAL at 05:07

## 2022-10-03 RX ADMIN — Medication 75 MICROGRAM(S): at 05:08

## 2022-10-03 RX ADMIN — Medication 500 MILLIGRAM(S): at 17:36

## 2022-10-03 NOTE — CONSULT NOTE ADULT - ASSESSMENT
Impression:  1. Dysphagia - differential includes pill esophagitis, but given chronicity can be strictures, rings, vs esophageal dysmotility    Plan:  -Given chronic dysphagia, plan for EGD  -Risks/benefits including risks of bleeding, infection, perforation explained. patient is agreeable to proceed  -Start PPI PO once daily  -Encourage drinking large amounts of fluids with pills  -NPO after midnight    Armando (Andrea Martines) MD Андрей  GI iphone: [phone]  GI e-mail: [email or web address] Impression:  1. Dysphagia - differential includes pill esophagitis, but given chronicity can be strictures, rings, vs esophageal dysmotility, vs Sicca    Plan:  -Given chronic dysphagia, plan for EGD  -Risks/benefits including risks of bleeding, infection, perforation explained. patient is agreeable to proceed  -Start PPI PO once daily  -Encourage drinking large amounts of fluids with pills  -NPO after midnight  -Check anti SSA/B given reported dry mouth/sicca like symptoms    Armando (Andrea Martines) MD Андрей  GI iphone: [phone]  GI e-mail: [email or web address]

## 2022-10-03 NOTE — PATIENT PROFILE ADULT - FALL HARM RISK - UNIVERSAL INTERVENTIONS
Bed in lowest position, wheels locked, appropriate side rails in place/Call bell, personal items and telephone in reach/Instruct patient to call for assistance before getting out of bed or chair/Non-slip footwear when patient is out of bed/[place] to call system/Physically safe environment - no spills, clutter or unnecessary equipment/Purposeful Proactive Rounding/Room/bathroom lighting operational, light cord in reach

## 2022-10-03 NOTE — PROGRESS NOTE ADULT - NSPROGADDITIONALINFOA_GEN_ALL_CORE
d/w acp    Jazmine Harmon MD  [facility] Division of Hospital Medicine  Available via MS Teams  Pager: [phone] d/w acp

## 2022-10-03 NOTE — PHYSICAL THERAPY INITIAL EVALUATION ADULT - GAIT DEVIATIONS NOTED, PT EVAL
decreased huong/increased time in double stance/decreased step length/decreased stride length/decreased weight-shifting ability

## 2022-10-03 NOTE — CONSULT NOTE ADULT - SUBJECTIVE AND OBJECTIVE BOX
Chief Complaint:  Patient is a 82y old  Female who presents with a chief complaint of difficulty swallowing (02 Oct 2022 14:29)      HPI: This is a 82 year old female with HTN, HLD, hypothyroidism, who presents with dysphagia. She had a tooth extraction/root canal on Friday 3 days ago, and was on Cipro with NSAIDs for pain. She developed nauesa/vomiting afterwards. She came to the ER, and this morning had dysphagia to Scottish Toast. It went down eventually. She was able to swallow for lunch (salmon) without any issues. She reports intermittent dysphagia to solids only here and there. She reports no odynophagia. She has been seen in the ER and by ENT for the dysphagia before, and was given previously a course of steroids in the ER and had negative laryngoscopy by ENT a few years ago. She had an EGD 5  years ago along with colonoscopy, and was told she had a hiatal hernia. She is not on PPIs but does have intermittent chronic reflux. She denies weight loss. No melena/hematochezia. She reports dry mouth and it feels like she doesn't have enough saliva when she gets the dysphagia episodes, but on the rest of the other days she feels well. She reports dry eyes sometimes but it's related to past eye surgery. She denies any arthritis or early morning pain/stiffness.     Allergies:  latex (Unknown)  sulfa drugs (Unknown)  sulfa, latex (Anaphylaxis; Rash)      Home Medications:    Hospital Medications:  acetaminophen     Tablet .. 650 milliGRAM(s) Oral every 6 hours PRN  aluminum hydroxide/magnesium hydroxide/simethicone Suspension 30 milliLiter(s) Oral every 4 hours PRN  atorvastatin 10 milliGRAM(s) Oral at bedtime  ciprofloxacin     Tablet 500 milliGRAM(s) Oral every 12 hours  enoxaparin Injectable 40 milliGRAM(s) SubCutaneous every 24 hours  influenza  Vaccine (HIGH DOSE) 0.7 milliLiter(s) IntraMuscular once  levothyroxine 75 MICROGram(s) Oral daily  losartan 50 milliGRAM(s) Oral daily  melatonin 3 milliGRAM(s) Oral at bedtime PRN  ondansetron Injectable 4 milliGRAM(s) IV Push every 8 hours PRN      PMHX/PSHX:  Asthma    Anxiety    Hypothyroid    Status Post Bunionectomy    History of  Section        Family history:  FHx: lymphoma (Mother)        Social History: Social ETOH, no smoking or illiict drugs    ROS:     General:  No wt loss, fevers, chills, night sweats, fatigue,   Eyes:  Good vision, no reported pain  ENT:  No sore throat, pain, runny nose, dysphagia  CV:  No pain, palpitations, hypo/hypertension  Resp:  No dyspnea, cough, tachypnea, wheezing  GI:  See HPI  :  No pain, bleeding, incontinence, nocturia  Muscle:  No pain, weakness  Neuro:  No weakness, tingling, memory problems  Psych:  No fatigue, insomnia, mood problems, depression  Endocrine:  No polyuria, polydipsia, cold/heat intolerance  Heme:  No petechiae, ecchymosis, easy bruisability  Skin:  No rash, edema      PHYSICAL EXAM:     GENERAL:  Appears stated age, well-groomed, well-nourished, no distress  HEENT:  NC/AT,  conjunctivae clear and pink,  no JVD,   CHEST:  Full & symmetric excursion, no increased effort, breath sounds clear  HEART:  Regular rhythm, S1, S2  ABDOMEN:  Soft, non-tender, non-distended, normoactive bowel sounds,  no masses ,  EXTREMITIES:  no cyanosis,clubbing or edema  SKIN:  No rash/erythema  NEURO:  Alert, oriented x 3  PSYCH: normal affect    Vital Signs:  Vital Signs Last 24 Hrs  T(C): 36.6 (03 Oct 2022 11:42), Max: 37.1 (02 Oct 2022 18:55)  T(F): 97.8 (03 Oct 2022 11:42), Max: 98.7 (02 Oct 2022 18:55)  HR: 74 (03 Oct 2022 11:42) (65 - 74)  BP: 142/81 (03 Oct 2022 11:42) (142/81 - 163/82)  BP(mean): --  RR: 18 (03 Oct 2022 11:42) (17 - 18)  SpO2: 95% (03 Oct 2022 11:42) (95% - 98%)    Parameters below as of 03 Oct 2022 11:42  Patient On (Oxygen Delivery Method): room air      Daily Height in cm: 149.86 (02 Oct 2022 18:55)    Daily     LABS:                        12.7   9.29  )-----------( 358      ( 02 Oct 2022 01:31 )             36.6     10-03    136  |  99  |  11  ----------------------------<  144<H>  4.0   |  24  |  0.90    Ca    8.7      03 Oct 2022 06:49  Phos  2.6     10  Mg     2.2     10-03                Imaging:    < from: CT Abdomen and Pelvis w/ IV Cont (21 @ 20:23) >  FINDINGS:  LOWER CHEST: Within normal limits.    LIVER: Within normal limits.  BILE DUCTS: Normal caliber.  GALLBLADDER: Within normal limits.  SPLEEN: Within normal limits.  PANCREAS: Within normal limits.  ADRENALS: Within normal limits.  KIDNEYS/URETERS: Within normal limits.    BLADDER: Within normal limits.  REPRODUCTIVE ORGANS: Left adnexal cyst 6.2 x 3.7 cm. Uterine leiomyomata.    BOWEL: No bowel obstruction. Appendix normal.  PERITONEUM: No ascites.  VESSELS: Within normal limits.  RETROPERITONEUM/LYMPH NODES: No lymphadenopathy.  ABDOMINAL WALL: Within normal limits.  BONES: Degenerative change.    IMPRESSION:  Left adnexal cyst.  No acute intra-abdominal process.    < end of copied text >

## 2022-10-03 NOTE — PHYSICAL THERAPY INITIAL EVALUATION ADULT - GENERAL OBSERVATIONS, REHAB EVAL
pt received in bed top rails up and 1 siderail HOb 30 bed in lowest position call bell,phone and table in reach , pt w/ ice pack on cheek (had tooth extraction recent ), pt agreeable for PT

## 2022-10-03 NOTE — PATIENT PROFILE ADULT - HOW PATIENT ADDRESSED, PROFILE
Behavioral Health Child & Adolescent Initial Assessment      PATIENT: Eren Burroughs    MRN: [id number]  : 2002  AGE: 16 year old    Date: 2019      Referred by: Veronica Boyd NP    Child's reason for seeking treatment:   [x]  Family  []  School   []  Marital [] Alcohol  []  Drug  []  Behavior  []  Trauma/Abuse  []  Self-harm  []  Anger  []  Work  [x]  Problems with mood [x] Anxiety [] Legal Issues  []  Grief/loss  []  Eating disorder  []  Health related problems   []  Major life changes in last year such as a move, a death, employment, relationship changes [] Childbirth/Adoption [] Other     Chief complaint in patient's own words: \"I just need to talk about stuff.\"    Problem(s) occur where?:    [x]  At Home                      [x]  In School                []  In the Community    Problem(s) began at what age?: \"A few years ago\"    Problem(s) duration:   []  Less than 6 months   [x]  More than 6 months    Child's strength(s):  Patient View: \"smart,compassionate, funny, talented in music and writing, kind, generous, discerning, helpful, insightful\" (mother).  Provider View: Articulate and wants to overcome depression    Child's limitation(s):   Patient View: Anxious, catastrophizing, recently quick to anger,ability to express feelings  Provider View: Current level of anxiety.    Brief History of Presenting Problem(s): Patient has been increasingly despondent and experiencing social anxiety and generalized anxiety for the past two years. During middle school he had engaged in self harm once. During the last two years he has had suicide ideation with a good deal of self blame.    Onset: Eighth grade    Precipitating Events: Parents found a suicide note in a book.  Patient says he had actually written the note sometime before that.    Parent/Child's goals for treatment: Learn effective coping skills.    Child's general happiness and well being:    []  Excellent    [x]  Good    [x]  Fair     []  Poor      []   Very Poor     SOCIAL HISTORY:  Current School: [employer]   Grade: Going into 11th grade  Teacher:   Special Programming:   Social Activities: Plays drums, guitar, composes music    ACADEMIC FUNCTIONING:  Current: Good  Past: Good  Learning Needs: None    BEHAVIOR IN SCHOOL:  Current: Good  Past: Good    [] Bullied   []  Bullies  [] Suspensions(s) (How many?)   [] Expulsion/pre-expulsion     LEGAL PROBLEMS:   [x] None  []  Tickets received?  Describe:   [] Operating While Intoxicated  []  Driving Under Influence /Substance Used:  []  Possession of illegal substance  []  Paraphernalia  [] Emergency prison  []  Court Stipulation  []  Protective Custody    [] On Probation/[place] Date:   [] Juvenile court contacts/Name/Telephone Number:   [] Charged as part of incident involving bodily harm      []  Member of gang  [] Bullying     [] Divorce/Custody Proceeding    WORK STATUS:  [] Employed   [] Unemployed   [x]  In School       IF EMPLOYED:   Place of Employment:   Position:   How Long:     PRIOR EMPLOYMENT HISTORY:   []  Yes   []  No  []  No problem     []  Laid off      []  Disciplinary Action     []  Job Loss(es)   []  Employee/Employer Conflicts   []  Leave of Absence  []  Absenteeism    []  Alcohol/Drug Related Problems    ARE YOU SATISFIED WITH YOUR JOB?  []  Yes   []  No      If No, describe     PARENT'S  SERVICE?   [x]  Yes []  No        Deployment/Redeployment  []  Yes  []   No    Honorable Discharge [x]  Yes   []  No    If no, describe:     FINANCIAL PROBLEMS:   [x]  None    []  Financial problems at home   []  Gambling  []  Illegal activities    SPIRITUALITY:  Is spirituality part of child's belief system?   [] Yes    [] No  If yes, does spirituality help child?  []  Yes []  No  [x]  Unsure    Is spirituality part of parent's belief system?   [x] Yes    [] No  If yes, does spirituality help family?  [x]  Yes []  No  []  Unsure    Child/family Mandaeism preference?    [x]  Yes  []   No   If yes, describe:      Anabaptist                 Spiritual concerns to address in therapy?   [x] Yes   []  No Parents would like him to have deeper sean    Spiritual expectations, values, or pressures causing conflict in child's life?    [] Yes   [x]  No    If yes, describe:     CULTURAL:  Any cultural issues or customs important to child?  [] Yes   [x]  No   If yes, describe:    Please describe you/your child's current living situation:  [x]  Home    []  Apartment    []  Group Home   []  Other - please explain    HOUSEHOLD MEMBERS:    Name Age Occupation/  School Relationship to Patient Lives With   Eren Burroughs 45 Emily Father Y   Kortney Burroughs 44  Mother Y                        OTHER FAMILY MEMBERS                                              Family Psychiatric History Diagnosis/Medications AODA   Mother:       [x]  Yes  []  No Depression anxiety []  Yes  []  No   Father:        [x]  Yes  []  No Depression, anxiety, PTSD []  Yes  []  No   Siblings:      []  Yes  []  No  []  Yes  []  No   Extended Family  []  Yes []  No  []  Yes  []  No     Other family resources (extended family, community, Mu-ism): Family    DEVELOPMENTAL HISTORY:  Mother's health during pregnancy: Excellent  Parental use of drugs, alcohol or cigarettes during pregnancy:  []  Yes   [x]  No  Prenatal Care:  [x]  Yes   []  No      Care:   [x]  Yes   []  No  Complications during pregnancy or delivery: None  Health problems noted at birth: None  Full term:  [x]  Yes   []  No      Birth weight: 7 lbs  11 oz  Birth length:  21 inches  Delayed sitting up:  []  Yes   [x]  No     Speech delay:         []  Yes   [x]  No  Delayed walking     []  Yes   [x]  No  Coordination difficulties:  []  Yes   [x]  No  Bedwetting:  []  Yes   [x]  No  Secure attachment: [] Yes    [x] No    If no, describe:  Acceptance of comfort/soothing: [x] Yes    [] No    If no, describe:   Consistent caregiver: [x] Yes    [] No    If no, describe:    History of :  [] Yes    [x] No    If yes, describe:   Quality of play skills: Wonderful    Child's current physical health:   [x]  Excellent    []  Good    []  Fair     []  Poor     []   Very Poor      Current and Past Medical History   Please check all that apply in past or present  []  Abnormal Blood Pressure       []  Acne  []  Anemia  []  Allergies  []  Arthritis/Rheumatism  []  Asthma  [x]  Broken Bones  []  Cancer  [x]  Changes in Appetite  []  Chronic Fatigue Syndrome  []  Cirrhosis  []  Diabetes  []  Eating Disorders  []  Emphysema  []  Epilepsy/Seizure  []  Fainting Spells   []  Fibromyalgia  []  Glaucoma/Cataracts  []  Hay Fever  []  Head Injury  []  Hearing Impaired  []  Hepatitis Type A, B, or C  []  Heart Disease  []  Heart Pacemaker  []  HIV Positive/AIDS/ARC  []  Irritable Bowel Syndrome  []  Kidney or Bladder Problems  []  Liver Disease  []  Memory Loss  []  Migraines  []  MRSA/VRE  []  Neurological Disorders  []  Pregnancy  []  Rheumatic Fever  []  Sickle Cell Disease  []  Skin Disorder  [x]  Sleep Disorder  []  Stroke  []  STD  []  Thyroid Problems  []  Tuberculosis  []  Tumors  []  Ulcer/Abdominal Pain  []  Visual Problems  [x]  Weight Changes  []  Other    Allergies:   ALLERGIES:   Allergen Reactions   • Amoxicillin RASH       Significant medical history, surgeries, hospitalizations for non-psychiatric medical conditions:   Past Medical History:   Diagnosis Date   • Environmental allergies    • Strep pharyngitis       Past Surgical History:   Procedure Laterality Date   • Elbow surgery Left 2015   • Tonsillectomy and adenoidectomy  10/2014   • Tooth extraction         Is child currently experiencing any acute or chronic pain?    []  Yes   [x]  No  How does child manage pain?: n/a    History of problems or reactions to medications?    []  Yes   []  No    If yes, describe: Amoxicillin    Tobacco use:    []  Current   []  Former  [x]  Never  Are you willing to make a Quit attempt?   []  Yes    []  No   [] Maybe    If yes, was [place] Tobacco Quit Line ([phone]) offered?  []  Yes    []  No      Use of caffeine   [x]  Yes   []  No    If yes, describe:     How does parent discipline child?: Non-punitive    PAST/PRESENT PSYCHIATRIC AND AODA TREATMENT HISTORY:  [x] None  Facility Name Physician Name Date Reason IP OP AODA                                Appetite:    [] Good [x] Fair   [] Poor       Weight:  [] Increase    Amount:     Duration:    [x] Decrease  Amount      Duration:      Eating Disorder Behaviors:  [] Yes  [x]  No  If Yes describe:  [] Binging  [] Purging  [] Restrictive Eating    Further Eating Disorder assessment needed?:  []  Yes  [x]  No     If yes, referred to whom?:     SUICIDE RISK ASSESSMENT:  YES NO If yes, describe   [] [x] Suicide attempt in last 24 hours?   []    [x]    Attempts in past?  How many?   Date of most recent:   Method used?:    [x] [] Suicidal thoughts?   [] [x] Plan or considering various methods? Describe:    [] [x] Access to means?  If yes, specify weapon location    [] [x] Indication of substance abuse/dependence?   [] [x] Any family members, loved ones, friends who committed suicide?   [] [x] Recent deaths, losses, anniversary dates?   [] [x] Has made preparations for death?   [] [x] Lack of support system?   [x] [] N/A Verbal contract for safety?   [x] [] Patient has no current intent or plan, but agrees to contact provider if SI arises.   [x] [] Patient given emergency 24 hour access information     VIOLENCE/HOMICIDE POTENTIAL:   YES NO If yes, describe current or past violence   [] [x] Threat made to harm or kill someone?    A specific individual?     Name:    [] [x] Access to weapon?  Where is weapon?    [] [x] History of violence/aggressive behavior to others?   [] [x] History of significant damage to property?   [] [x] Indication of substance abuse/dependence?   [] [x] Witnessed violence or significant aggression?     Does patient experience anger  Rubina  management problems?     [x]  Yes   []  No   If yes, describe:  Irritability    How does the patient cope with anger?: Isolates    TRAUMA ASSESSMENT  YES NO If yes, describe current and past trauma    [] [x] Physically abused?   [] [x] Emotionally abused?   [] [x] Sexually abused?   [] [x] Verbally abused?   [] [x] Exposed to domestic violence, community violence or  violence?  Describe:    [] [x] Been physically, sexually or verbally abusive to others?   [] [x] Self-abuse (cutting/burning/head banging)   [] [x] Safety concerns for anyone in the family?      Abuse Reported?    []  Yes   []  No   [x] N/A  To whom?   Date:    Outcome:     Sexual/Gender orientation issues?:   []  Yes   [x]  No    If yes, describe:     Patient Support System:  Does the patient want family or others involved in treatment or education and learning beyond family members?    [x] Yes   []  No  If yes, whom? Family     GENERAL APPEARANCE:  Patient Appears [x] Stated age     []  Young for age   [] Mature for age  Dress   [x] Unremarkable    []  Remarkable    MOTOR SKILLS:  Posture  [x] Unremarkable  [] Slumped  []  Rigid  Gait   [x] Unremarkable  [] Slow         [] Hyperactive  Mannerisms  [x] Unremarkable  [] Gestures  [] Grimaces  [] Twitches/tics      [] Tremors  Activity  [x] Unremarkable  [] Uncoordinated  [] Agitated  [] Lethargic                      [] Hyperactive      [] Withdrawn      SYMPTOMS If Yes, Specify Frequency/Duration     Yes     [x] Sad/down moderate   [] Crying    [] Loss of interest in usual activities    [x]  Child's activity level (inactive, average, overactive) average   [x] Social withdrawal Mild, due to emerging social anxiety   []    Aggressive Behavior    [x] Irritability moderate   [] Mood swings    [] Sleep problems    [] Difficulty falling asleep    [] Difficulty staying asleep    [] Early AM awakening    [] Nightmares    [] Hopelessness    [] Hallucinations/delusions    [x] Anxiety moderate   []  Separation anxiety    [] Panic attacks    [] Obsessions/compulsions    [] Concentration    [] Distractibility    [] Hyperactivity    []    Impulsivity    [] Attention problems    [] Oppositional/defiant    [] Conduct problems    [] Fire setting    [] Animal abuse    [] Stealing    [] Autism Spectrum Disorder Symptoms    [] Stereotypic Behavior    [] Social relation issues    [] Sensory Issues    []    Paranoia    []  Sexualized Play    []  Chronic Lying    [x]  Low self esteem Mild   [] Other        MENTAL STATUS EXAM:  Hygiene Concerns:  [x]  No   []  Yes   Describe:    Appearance:   [x]  Unremarkable  []  Other    Distress:  []  None  []  Mild   [x]  Moderate    []  Acute  Eye Contact:    [x]  Maintained  [] Avoided [] Stover intense  [] Improving  Behavior:  [x]  Normal  []  Restless  []  Compulsive  []  Tremulous     []  Lethargic  []  Uncoordinated  Mood/Feelings: []  Normal  []  Euthymic  [x]  Depressed  [x]  Irritable  [x]  Anxious     []  Fearful [] Euphoric [] Labile [] Grief [] Paranoia [] Panic     [] Guilt/Shame [] Apathy/Indifference [] Jealousy [] Helpless     [] Hopeless [] Other  Affect:              []  Normal  [x]  Constricted  [] Flat  []  Blunted      [x] Appropriate to Content   []Inappropriate to Content  Thought Processes: [x]  Congruent  []  Incongruent  [] Loose Associations     []  Poverty of Ideas  []  Tangential    []  Incoherence     []  Blocking/thought interruption  Thought Content: [x]  Normal  []  Delusions  []  Obsessions  []  Phobias     []  Hypochondria  []  Sexual Preoccupation  Perceptual Issues:    [x]  None  [] Hallucinations  []  Auditory  [] Visual  [] Perceptual  Orientation:  [x]  Normal/No Impairment  []  Person  []  Place  []  Time  Speech:  [x]  Clear/Articulate  [] Soft  [] Loud  []  Pressured  []  Animated     []  Rambling  []  Slurred []  Poor Articulation  Insight:  [x]  Good  []  Fair  []  Poor  []  Age Appropriate  Judgement:  []  Good  [x]  Fair  []  Poor  Recent  Memory: [x]  Good  []  Fair  []  Poor  Remote Memory: [x]  Good  []  Fair  []  Poor  Concentration: [x]  Good  []  Fair  []  Poor  Attention:  [x]  Good  []  Fair  []  Poor  Level of Engagement:   [x]  Open  []  Guarded    [] Resistant    AODA Self Report Responses for Adolescents 14 and Above:    Have you ridden in a car driven by someone (including the yourself) who was high or had been using drugs?  [x]  Yes   []  No      Do you ever use alcohol or drugs to relax, feel better about self or to fit in?   [x]  Yes   []  No      Do you ever use alcohol or drugs while you are by yourself?  [x]  Yes   []  No      Do you ever forget things they did while using alcohol or drugs?    [x] Yes  []  No      Does your family or friends ever tell you that you should cut down on drinking or drug use?  []  Yes   [x]  No      Have you ever gotten into trouble while they were using alcohol or drugs?  []  Yes   [x]  No      Resource information given?    [x]  Yes   []  No       Parent's capacity to be involved in treatment:    [x]  Good   []  Limited   []  Poor    DIAGNOSIS:  F90.2 ADHD (attention deficit hyperactivity disorder), combined type  (primary encounter diagnosis)  F43.29 Adjustment disorder with mixed emotional features    Refer for Psychotherapy?:     [x] Yes     Estimated Length of Treatment: 6 Months  []  No    [] Assessment Only   [] Refer to Higher Level of Care   [] Refer for Medication Assessment    Patient given emergency 24 hour access information?  [x]   Yes   []  No    INITIAL PROGRESS NOTE (To include an integrated clinical summary):     D:  Patient is a very pleasant 15 y/o high school jess who is respectful and non-defensive in session.  He is accompanied initially by his Mother Kortney.  Patient and his mother have good communication. Patient sits with his arms folded and has obvious motor tension. He is clearly in emotional distress. He is intelligent and articulate, expressing his emotions well. Patient  is passionate about his music and it is an important source of self esteem for him. His thoughts are logical and organized. As the session progresses he relaxes.  Mother states that she found a suicide note in patient's book.  He had apparently written the note months before but admits that he had written other suicide notes before that. Patient had one episode of cutting in the eighth grade.  He engages in a good deal of catastrophizing and self-blame. Patient admits to using marijuana approximately every other day if available.  He first used about 1 year ago. Alcohol has been used more rarely but he acknowledges being intoxicated about ten times in the past two years. Patient has used alcohol and other drugs mostly with friends but occasionally alone.  He has alcohol/drug outcome expectancies for positive socialization and stress management/relaxation effects. He is pre-contemplative regarding motivation for change. Patient states that he becomes angry \"If I expect something and it's different\"  A:  Provider used motivational interviewing techniques to facilitate open discussion of his concerns.  Patient is respectful and in distress with a strong desire to cope more effectively. Education about depression and anxiety was presented along with a description of cognitive behavior therapy.  In order to more fully educate, a downward arrow technique was used to show possible core beliefs that are causing anxiety. Patient was asked to review a handout about self blame to be discussed in the next session as well as a strengths worksheet and a personal goals worksheet.  R:  Patient has made a good start to therapy.  His comfort level at the end of the session was good.  He agrees to contact his doctor, family members, or emergency services if he experiences suicide thoughts.  P:  A treatment plan will be formulated to include decatastrophization skills, worry management skills, worry exposure skills, problem solving  skills training, and goal-ladder development to increase activity that is incompatible with substance use and to decrease anxiety.    Simon Lopez, LCSW, PhD

## 2022-10-03 NOTE — PHYSICAL THERAPY INITIAL EVALUATION ADULT - PERTINENT HX OF CURRENT PROBLEM, REHAB EVAL
82F PMH HTN, hypothyroidism, HLD , anxiety, Asthma p/w difficulty swallowing x few hours. Pt reports having emergency dental extraction at 6pm. She was prescribed tylenol+codeine, and cipro 500mg BID. She took all the meds around 7pm after which she had some rice pudding. Around 11/midnight, she reports feeling dizzy, nauseous with 1 episode of small volume blood tinged emesis and abdominal cramping. She then noticed she was finding it difficult to swallow and her mouth felt dry. She called her grandson who then brought her to the hospital. Of note, she has tolerated cipro in the past given to her after eye surgery. She had a similar episode with dysphagia back in 2016 when she presented here and was treated with decadron in the ED and discharged with ENT f/u; NA on 10/2/22 134 ; (-) for SARsCO2 /Flus/RVP on 10/1/22 82F PMH HTN, hypothyroidism, HLD , anxiety, Asthma p/w difficulty swallowing x few hours. Pt reports having emergency dental extraction at 6pm. She was prescribed tylenol+codeine, and cipro 500mg BID. She took all the meds around 7pm had not eaten anything ; Around 11/midnight, she reports feeling dizzy, nauseous with 1 episode of small volume blood tinged emesis and abdominal cramping. She then noticed she was finding it difficult to swallow and her mouth felt dry. She called her grandson who then brought her to the hospital. Of note, she has tolerated cipro in the past given to her after eye surgery. She had a similar episode with dysphagia back in 2016 when she presented here and was treated with decadron in the ED and discharged with ENT f/u; NA on 10/2/22 134 ; (-) for SARsCO2 /Flus/RVP on 10/1/22

## 2022-10-03 NOTE — PHYSICAL THERAPY INITIAL EVALUATION ADULT - NSPTDISCHREC_GEN_A_CORE
PT recommend Home with Outpt PT to increase gait/ balance /endurance and fall prevention education continued/Outpatient PT

## 2022-10-03 NOTE — PHYSICAL THERAPY INITIAL EVALUATION ADULT - LEVEL OF INDEPENDENCE: GAIT, REHAB EVAL
close ; pt first amb w/o AD close supervision 150 ft intermittent mild unsteadiness pt able to regain balance on own ; pt then amb with std cane 150-200 ft steady gait with std cane independent no LOB pt educated to use std cane to amb/supervision

## 2022-10-03 NOTE — PHYSICAL THERAPY INITIAL EVALUATION ADULT - BED MOBILITY LIMITATIONS, REHAB EVAL
pt sat on EOB good balance can don /doff socks indep , pt educated re wait with change of position and understood sat x few min no cc's

## 2022-10-03 NOTE — PHYSICAL THERAPY INITIAL EVALUATION ADULT - ADDITIONAL COMMENTS
pt lives in [place] apt with 3 steps to enter with HR ; pt has a tub shower with non skid corin (pt educated on where and how to obtain shower seat or bench ) ;pt lives alone ; no caregiver ID has son and grandson that don't live far away and assist as needed ; pt use no AD PTA to amb or transfer

## 2022-10-03 NOTE — PHYSICAL THERAPY INITIAL EVALUATION ADULT - THERAPY FREQUENCY, PT EVAL
pt clear for discharge from PT standpoint ANILA osei ,will keep on program 1-2 x/wk to monitor progress and improve balance/1-2x/week

## 2022-10-04 ENCOUNTER — TRANSCRIPTION ENCOUNTER (OUTPATIENT)
Age: 82
End: 2022-10-04

## 2022-10-04 ENCOUNTER — RESULT REVIEW (OUTPATIENT)
Age: 82
End: 2022-10-04

## 2022-10-04 VITALS
DIASTOLIC BLOOD PRESSURE: 75 MMHG | RESPIRATION RATE: 17 BRPM | TEMPERATURE: 98 F | OXYGEN SATURATION: 95 % | HEART RATE: 73 BPM | SYSTOLIC BLOOD PRESSURE: 145 MMHG

## 2022-10-04 PROCEDURE — 88305 TISSUE EXAM BY PATHOLOGIST: CPT | Mod: 26

## 2022-10-04 PROCEDURE — 99239 HOSP IP/OBS DSCHRG MGMT >30: CPT

## 2022-10-04 PROCEDURE — 43239 EGD BIOPSY SINGLE/MULTIPLE: CPT | Mod: GC

## 2022-10-04 RX ORDER — LIDOCAINE HCL 20 MG/ML
4 VIAL (ML) INJECTION ONCE
Refills: 0 | Status: COMPLETED | OUTPATIENT
Start: 2022-10-04 | End: 2022-10-04

## 2022-10-04 RX ORDER — PANTOPRAZOLE SODIUM 20 MG/1
40 TABLET, DELAYED RELEASE ORAL EVERY 12 HOURS
Refills: 0 | Status: DISCONTINUED | OUTPATIENT
Start: 2022-10-04 | End: 2022-10-04

## 2022-10-04 RX ORDER — MOXIFLOXACIN HYDROCHLORIDE TABLETS, 400 MG 400 MG/1
1 TABLET, FILM COATED ORAL
Qty: 0 | Refills: 0 | DISCHARGE

## 2022-10-04 RX ORDER — IPRATROPIUM/ALBUTEROL SULFATE 18-103MCG
3 AEROSOL WITH ADAPTER (GRAM) INHALATION ONCE
Refills: 0 | Status: COMPLETED | OUTPATIENT
Start: 2022-10-04 | End: 2022-10-04

## 2022-10-04 RX ORDER — PANTOPRAZOLE SODIUM 20 MG/1
1 TABLET, DELAYED RELEASE ORAL
Qty: 60 | Refills: 0
Start: 2022-10-04 | End: 2022-11-02

## 2022-10-04 RX ADMIN — Medication 75 MICROGRAM(S): at 11:49

## 2022-10-04 RX ADMIN — Medication 3 MILLILITER(S): at 09:09

## 2022-10-04 RX ADMIN — Medication 500 MILLIGRAM(S): at 11:49

## 2022-10-04 RX ADMIN — LOSARTAN POTASSIUM 50 MILLIGRAM(S): 100 TABLET, FILM COATED ORAL at 11:41

## 2022-10-04 RX ADMIN — Medication 4 MILLILITER(S): at 09:09

## 2022-10-04 NOTE — PROGRESS NOTE ADULT - PROBLEM SELECTOR PLAN 7
-pain control  -can continue with cipro as pt has tolerated drug in the past
-pain control - tylenol  -can continue with cipro as pt has tolerated drug in the past

## 2022-10-04 NOTE — PROGRESS NOTE ADULT - PROBLEM SELECTOR PLAN 2
-likely siadh, fluid restricted, now sodium rapidly corrected from 126->134  -give bolus D5 only 250, to ensure slower correction  -next bmp 6pm 10/2  -discussed importance of inpatient management today, patient agreeable to stay, plan also explained to son at bedside.
-hypotonic hyponatremia appears to be chronic based on labs from prior admission  -trend BMP q12  -urine studies/serum osm consistent with SIADH, will fluid restrict and monitor
-likely siadh, fluid restricted, now sodium  corrected
-likely siadh, fluid restricted, now sodium  corrected

## 2022-10-04 NOTE — PRE PROCEDURE NOTE - PRE PROCEDURE EVALUATION
Attending Physician: Andrea Chiang MD    Procedure: EGD    Indication for Procedure: Dysphagia  ________________________________________________________  PAST MEDICAL & SURGICAL HISTORY:  Asthma      Anxiety      Hypothyroid      Status Post Bunionectomy  2weeks ago      History of  Section        ALLERGIES:  latex (Unknown)  sulfa drugs (Unknown)  sulfa, latex (Anaphylaxis; Rash)    HOME MEDICATIONS:  atorvastatin 10 mg oral tablet: 1 tab(s) orally once a day  Cipro 500 mg oral tablet: 1 tab(s) orally every 12 hours  losartan 50 mg oral tablet: 1 tab(s) orally once a day  Synthroid 75 mcg (0.075 mg) oral tablet: 1 tab(s) orally once a day    AICD/PPM: [ ] yes   [x ] no    PERTINENT LAB DATA:    10-03    136  |  99  |  11  ----------------------------<  144<H>  4.0   |  24  |  0.90    Ca    8.7      03 Oct 2022 06:49  Phos  2.6     10-  Mg     2.2     10-03                  PHYSICAL EXAMINATION:    T(C): 35.9  HR: 78  BP: 197/82  RR: 14  SpO2: 100%    Constitutional: NAD  HEENT: PERRLA, EOMI,    Neck:  No JVD  Respiratory: CTAB/L  Cardiovascular: S1 and S2  Gastrointestinal: BS+, soft, NT/ND  Extremities: No peripheral edema  Neurological: A/O x 3, no focal deficits  Psychiatric: Normal mood, normal affect  Skin: No rashes    ASA Class: I [ ]  II [x ]  III [ ]  IV [ ]    COMMENTS:    The patient is a suitable candidate for the planned procedure unless box checked [ ]  No, explain:

## 2022-10-04 NOTE — PROGRESS NOTE ADULT - PROBLEM SELECTOR PLAN 4
-suspect reactive in s/o n/v  -c/w cipro for 3 more days
-suspect reactive in s/o n/v  -c/w cipro to completion
-suspect reactive in s/o n/v  -completed Cipro
-suspect reactive in s/o n/v  -c/w cipro for 3 more days

## 2022-10-04 NOTE — PRE-OP CHECKLIST - NS PREOP CHK INSULIN PUMP THERAPY
----- Message from Edin Rehman sent at 6/15/2018  2:26 PM EDT -----  Regarding: FW: needs OV      ----- Message -----  From: Verneita Boast, MD  Sent: 6/11/2018   9:20 PM  To: [facility]  Subject: needs OV                                         With any available VS or AP  Thanks,  n/a

## 2022-10-04 NOTE — GOALS OF CARE CONVERSATION - ADVANCED CARE PLANNING - CONVERSATION DETAILS
This alert and oriented x 4 patient was seen today for goals of care planning and conversation. Introduced self and role of PCE. Patient states understanding of health status  and prognosis. Patient states  she lives alone and has been independent with all aspects of ADLs prior to admission. Patient states  she does not have a HCP and declined to complete one at the bedside. Patient states she has three children and seven grand-children and that her children will make all medical decisions for her. Patient educated that if she does not designate a health care agent her children will be considered family health care decision act decision maker and be able to make decisions for her. equally.     CPR , intubation, artificial nutrition  procedures and possible complications explained. Patient watched informational video on Choosing a HCP. Patient states her wish is to be Full Code. Patient states she would like heroic measures to save her life as she enjoys her grandchildren and would love to be around for them. Patient states she would like CPR/Intubation as well as artificial nutrition. Explained hospital base Full Code policy, verbalise understanding. Patient aware she will remain FULL CODE .    Contact information for further needs provided.  Patient states she is [employer] and would like to be discharged before sundown. Provider aware..      Kristi Hall  MSN -ED RN OCN     [phone]

## 2022-10-04 NOTE — GOALS OF CARE CONVERSATION - ADVANCED CARE PLANNING - SURROGATE NAME
Savage Polo ([facility]) [phone] Coral Casey [phone] Amarilys Mcclendon [phone] Savage Polo (son) [phone] Coral Casey [phone] (dtr) Amarilys Mcclendon (dtr) [phone]

## 2022-10-04 NOTE — PROGRESS NOTE ADULT - ASSESSMENT
82F PMH HTN, hypothyroidism, HLD , anxiety, Asthma p/w difficulty swallowing x few hours. Also a/w n/v and abdominal pain, course complicated by persistent hyponatremia 
82F PMH HTN, hypothyroidism, HLD , anxiety, Asthma p/w difficulty swallowing x few hours. Also a/w n/v and abdominal pain
82F PMH HTN, hypothyroidism, HLD , anxiety, Asthma p/w difficulty swallowing x few hours. Also a/w n/v and abdominal pain, course complicated by persistent hyponatremia 
82F PMH HTN, hypothyroidism, HLD , anxiety, Asthma p/w difficulty swallowing x few hours. Also a/w n/v and abdominal pain, course complicated by persistent hyponatremia

## 2022-10-04 NOTE — PROGRESS NOTE ADULT - SUBJECTIVE AND OBJECTIVE BOX
[facility] Division of Hospital Medicine  Jazmine Harmon MD  Available via MS Teams  Pager: [phone]    SUBJECTIVE / OVERNIGHT EVENTS:  wanted to go home this morning, but explained that sodium increased very quickly and needs D5. Explained to [place] t bedside as well. otherwise has some R shoulder pain. denies any chest pain, nausea, vomiting, headaches, shortness of breath, cough.     ADDITIONAL REVIEW OF SYSTEMS:    MEDICATIONS  (STANDING):  acetaminophen   IVPB .. 1000 milliGRAM(s) IV Intermittent once  atorvastatin 10 milliGRAM(s) Oral at bedtime  ciprofloxacin     Tablet 500 milliGRAM(s) Oral every 12 hours  dextrose 5%. 250 milliLiter(s) (125 mL/Hr) IV Continuous <Continuous>  enoxaparin Injectable 40 milliGRAM(s) SubCutaneous every 24 hours  levothyroxine 75 MICROGram(s) Oral daily  losartan 50 milliGRAM(s) Oral daily    MEDICATIONS  (PRN):  acetaminophen     Tablet .. 650 milliGRAM(s) Oral every 6 hours PRN Temp greater or equal to 38C (100.4F), Mild Pain (1 - 3)  aluminum hydroxide/magnesium hydroxide/simethicone Suspension 30 milliLiter(s) Oral every 4 hours PRN Dyspepsia  melatonin 3 milliGRAM(s) Oral at bedtime PRN Insomnia  ondansetron Injectable 4 milliGRAM(s) IV Push every 8 hours PRN Nausea and/or Vomiting      I&O's Summary      PHYSICAL EXAM:  Vital Signs Last 24 Hrs  T(C): 36.6 (02 Oct 2022 08:32), Max: 36.8 (01 Oct 2022 20:37)  T(F): 97.8 (02 Oct 2022 08:32), Max: 98.3 (01 Oct 2022 20:37)  HR: 58 (02 Oct 2022 08:32) (58 - 79)  BP: 156/89 (02 Oct 2022 08:32) (143/73 - 158/78)  BP(mean): --  RR: 18 (02 Oct 2022 08:32) (18 - 18)  SpO2: 98% (02 Oct 2022 08:32) (96% - 98%)    Parameters below as of 02 Oct 2022 08:32  Patient On (Oxygen Delivery Method): room air      CONSTITUTIONAL: NAD, well-developed, well-groomed  EYES: PERRLA; conjunctiva and sclera clear  ENMT: Moist oral mucosa, no pharyngeal injection or exudates; normal dentition  NECK: Supple, no palpable masses; no thyromegaly  RESPIRATORY: Normal respiratory effort; lungs are clear to auscultation bilaterally  CARDIOVASCULAR: Regular rate and rhythm, normal S1 and S2, no murmur/rub/gallop; No lower extremity edema; Peripheral pulses are 2+ bilaterally  ABDOMEN: Nontender to palpation, normoactive bowel sounds, no rebound/guarding; No hepatosplenomegaly  MUSCULOSKELETAL:  Normal gait; no clubbing or cyanosis of digits; no joint swelling or tenderness to palpation  PSYCH: A+O to person, place, and time; affect appropriate  NEUROLOGY: CN 2-12 are intact and symmetric; no gross sensory deficits   SKIN: No rashes; no palpable lesions    LABS:                        12.7   9.29  )-----------( 358      ( 02 Oct 2022 01:31 )             36.6     10-02    134<L>  |  97  |  7   ----------------------------<  131<H>  4.2   |  24  |  0.86    Ca    8.9      02 Oct 2022 09:48  Phos  2.6     10-02  Mg     2.2     10-02    TPro  7.0  /  Alb  4.1  /  TBili  0.6  /  DBili  x   /  AST  40  /  ALT  42  /  AlkPhos  81  10-01          Urinalysis Basic - ( 01 Oct 2022 04:36 )    Color: Colorless / Appearance: Clear / S.011 / pH: x  Gluc: x / Ketone: Trace  / Bili: Negative / Urobili: Negative   Blood: x / Protein: Negative / Nitrite: Negative   Leuk Esterase: Negative / RBC: x / WBC x   Sq Epi: x / Non Sq Epi: x / Bacteria: x        Culture - Urine (collected 01 Oct 2022 04:36)  Source: Clean Catch Clean Catch (Midstream)  Final Report (02 Oct 2022 11:49):    <10,000 CFU/mL Normal Urogenital Juliana          RADIOLOGY & ADDITIONAL TESTS:  New Results Reviewed Today: cbc bmp  New Imaging Personally Reviewed Today: n/a  New Electrocardiogram Personally Reviewed Today: n/a   Prior or Outpatient Records Reviewed Today: n/a     COMMUNICATION:  Care Discussed with Consultants/Other Providers and Details of Discussion:  Discussions with Patient/Family: son at bedside   PCP Communication:    
[facility] Division of Hospital Medicine  Rebecca Woods MD  Pager (CHAPO-F, 3Z-1B): [phone]  Other Times:  [phone]    Patient is a 82y old  Female who presents with a chief complaint of difficulty swallowing (03 Oct 2022 16:30)      SUBJECTIVE / OVERNIGHT EVENTS:  afebrile. feeling well. denies any specific complaints.     ADDITIONAL REVIEW OF SYSTEMS: otherwise neg    MEDICATIONS  (STANDING):  atorvastatin 10 milliGRAM(s) Oral at bedtime  enoxaparin Injectable 40 milliGRAM(s) SubCutaneous every 24 hours  influenza  Vaccine (HIGH DOSE) 0.7 milliLiter(s) IntraMuscular once  levothyroxine 75 MICROGram(s) Oral daily  losartan 50 milliGRAM(s) Oral daily  pantoprazole    Tablet 40 milliGRAM(s) Oral every 12 hours    MEDICATIONS  (PRN):  acetaminophen     Tablet .. 650 milliGRAM(s) Oral every 6 hours PRN Temp greater or equal to 38C (100.4F), Mild Pain (1 - 3)  aluminum hydroxide/magnesium hydroxide/simethicone Suspension 30 milliLiter(s) Oral every 4 hours PRN Dyspepsia  melatonin 3 milliGRAM(s) Oral at bedtime PRN Insomnia  ondansetron Injectable 4 milliGRAM(s) IV Push every 8 hours PRN Nausea and/or Vomiting      CAPILLARY BLOOD GLUCOSE        I&O's Summary    03 Oct 2022 07:01  -  04 Oct 2022 07:00  --------------------------------------------------------  IN: 480 mL / OUT: 0 mL / NET: 480 mL    04 Oct 2022 07:01  -  04 Oct 2022 15:07  --------------------------------------------------------  IN: 300 mL / OUT: 0 mL / NET: 300 mL        PHYSICAL EXAM:  Vital Signs Last 24 Hrs  T(C): 36.8 (04 Oct 2022 13:56), Max: 36.8 (03 Oct 2022 21:55)  T(F): 98.3 (04 Oct 2022 13:56), Max: 98.3 (04 Oct 2022 13:56)  HR: 73 (04 Oct 2022 13:56) (65 - 84)  BP: 145/75 (04 Oct 2022 13:56) (93/48 - 197/82)  BP(mean): --  RR: 17 (04 Oct 2022 13:56) (14 - 19)  SpO2: 95% (04 Oct 2022 13:56) (95% - 100%)    Parameters below as of 04 Oct 2022 13:56  Patient On (Oxygen Delivery Method): room air    CONSTITUTIONAL: NAD, well-groomed  EYES:  conjunctiva and sclera clear  ENMT: Moist oral mucosa  NECK: Supple, no palpable masses; no JVD  RESPIRATORY: Normal respiratory effort; lungs are clear to auscultation bilaterally  CARDIOVASCULAR: Regular rate and rhythm, normal S1 and S2, no murmur/rub/gallop; No lower extremity edema  ABDOMEN: Nontender to palpation, normoactive bowel sounds, no rebound/guarding  MUSCULOSKELETAL:  no clubbing or cyanosis of digits; no joint swelling or tenderness to palpation  PSYCH: A+O to person, place, and time; affect appropriate  SKIN: No rashes; no palpable lesions    LABS:    10-03    136  |  99  |  11  ----------------------------<  144<H>  4.0   |  24  |  0.90    Ca    8.7      03 Oct 2022 06:49  Phos  2.6     10-03  Mg     2.2     10-03                  RADIOLOGY & ADDITIONAL TESTS:  Results Reviewed:   Imaging Personally Reviewed:  Electrocardiogram Personally Reviewed:    COORDINATION OF CARE:  Care Discussed with Consultants/Other Providers [Y/N]:  Prior or Outpatient Records Reviewed [Y/N]:  
[facility] Division of Hospital Medicine  Jazmine Harmon MD  Available via MS Teams  Pager: [phone]    SUBJECTIVE / OVERNIGHT EVENTS:  -denies any acute events overnight. able to eat more now than earlier.     ADDITIONAL REVIEW OF SYSTEMS:    MEDICATIONS  (STANDING):  atorvastatin 10 milliGRAM(s) Oral at bedtime  ciprofloxacin     Tablet 500 milliGRAM(s) Oral every 12 hours  enoxaparin Injectable 40 milliGRAM(s) SubCutaneous every 24 hours  levothyroxine 75 MICROGram(s) Oral daily  losartan 50 milliGRAM(s) Oral daily    MEDICATIONS  (PRN):  acetaminophen     Tablet .. 650 milliGRAM(s) Oral every 6 hours PRN Temp greater or equal to 38C (100.4F), Mild Pain (1 - 3)  aluminum hydroxide/magnesium hydroxide/simethicone Suspension 30 milliLiter(s) Oral every 4 hours PRN Dyspepsia  melatonin 3 milliGRAM(s) Oral at bedtime PRN Insomnia  ondansetron Injectable 4 milliGRAM(s) IV Push every 8 hours PRN Nausea and/or Vomiting      I&O's Summary      PHYSICAL EXAM:  Vital Signs Last 24 Hrs  T(C): 36.9 (01 Oct 2022 11:19), Max: 36.9 (01 Oct 2022 11:19)  T(F): 98.5 (01 Oct 2022 11:19), Max: 98.5 (01 Oct 2022 11:19)  HR: 69 (01 Oct 2022 11:19) (64 - 83)  BP: 145/72 (01 Oct 2022 11:19) (114/76 - 166/64)  BP(mean): 95 (01 Oct 2022 01:15) (95 - 95)  RR: 18 (01 Oct 2022 11:19) (18 - 20)  SpO2: 97% (01 Oct 2022 11:19) (97% - 98%)    Parameters below as of 01 Oct 2022 11:19  Patient On (Oxygen Delivery Method): room air      CONSTITUTIONAL: NAD, well-developed, well-groomed  EYES: PERRLA; conjunctiva and sclera clear  ENMT: Moist oral mucosa, no pharyngeal injection or exudates; normal dentition  NECK: Supple, no palpable masses; no thyromegaly  RESPIRATORY: Normal respiratory effort; lungs are clear to auscultation bilaterally  CARDIOVASCULAR: Regular rate and rhythm, normal S1 and S2, no murmur/rub/gallop; No lower extremity edema; Peripheral pulses are 2+ bilaterally  ABDOMEN: Nontender to palpation, normoactive bowel sounds, no rebound/guarding; No hepatosplenomegaly  MUSCULOSKELETAL:  Normal gait; no clubbing or cyanosis of digits; no joint swelling or tenderness to palpation  PSYCH: A+O to person, place, and time; affect appropriate  NEUROLOGY: CN 2-12 are intact and symmetric; no gross sensory deficits   SKIN: No rashes; no palpable lesions    LABS:                        13.9   11.98 )-----------( 424      ( 01 Oct 2022 14:27 )             41.0     10-    127<L>  |  93<L>  |  10  ----------------------------<  186<H>  4.1   |  19<L>  |  0.69    Ca    8.8      01 Oct 2022 05:13  Phos  2.5     10-  Mg     2.0     10-    TPro  7.0  /  Alb  4.1  /  TBili  0.6  /  DBili  x   /  AST  40  /  ALT  42  /  AlkPhos  81  10-          Urinalysis Basic - ( 01 Oct 2022 04:36 )    Color: Colorless / Appearance: Clear / S.011 / pH: x  Gluc: x / Ketone: Trace  / Bili: Negative / Urobili: Negative   Blood: x / Protein: Negative / Nitrite: Negative   Leuk Esterase: Negative / RBC: x / WBC x   Sq Epi: x / Non Sq Epi: x / Bacteria: x            RADIOLOGY & ADDITIONAL TESTS:  New Results Reviewed Today: cbc cmp  New Imaging Personally Reviewed Today:  New Electrocardiogram Personally Reviewed Today:  Prior or Outpatient Records Reviewed Today:    COMMUNICATION:  Care Discussed with Consultants/Other Providers and Details of Discussion:  Discussions with Patient/Family:  PCP Communication:    
[facility] Division of Hospital Medicine  Rebecca Woods MD  Pager (M-F, 6N-5H): [phone]  Other Times:  [phone]    Patient is a 82y old  Female who presents with a chief complaint of difficulty swallowing (03 Oct 2022 16:19)      SUBJECTIVE / OVERNIGHT EVENTS:  still c/o difficulty swallowing at time. this am again during lunch.     ADDITIONAL REVIEW OF SYSTEMS: otherwise neg    MEDICATIONS  (STANDING):  atorvastatin 10 milliGRAM(s) Oral at bedtime  ciprofloxacin     Tablet 500 milliGRAM(s) Oral every 12 hours  enoxaparin Injectable 40 milliGRAM(s) SubCutaneous every 24 hours  influenza  Vaccine (HIGH DOSE) 0.7 milliLiter(s) IntraMuscular once  levothyroxine 75 MICROGram(s) Oral daily  losartan 50 milliGRAM(s) Oral daily    MEDICATIONS  (PRN):  acetaminophen     Tablet .. 650 milliGRAM(s) Oral every 6 hours PRN Temp greater or equal to 38C (100.4F), Mild Pain (1 - 3)  aluminum hydroxide/magnesium hydroxide/simethicone Suspension 30 milliLiter(s) Oral every 4 hours PRN Dyspepsia  melatonin 3 milliGRAM(s) Oral at bedtime PRN Insomnia  ondansetron Injectable 4 milliGRAM(s) IV Push every 8 hours PRN Nausea and/or Vomiting      CAPILLARY BLOOD GLUCOSE        I&O's Summary    02 Oct 2022 07:01  -  03 Oct 2022 07:00  --------------------------------------------------------  IN: 240 mL / OUT: 0 mL / NET: 240 mL    03 Oct 2022 07:01  -  03 Oct 2022 16:30  --------------------------------------------------------  IN: 480 mL / OUT: 0 mL / NET: 480 mL        PHYSICAL EXAM:  Vital Signs Last 24 Hrs  T(C): 36.6 (03 Oct 2022 11:42), Max: 37.1 (02 Oct 2022 18:55)  T(F): 97.8 (03 Oct 2022 11:42), Max: 98.7 (02 Oct 2022 18:55)  HR: 74 (03 Oct 2022 11:42) (65 - 74)  BP: 142/81 (03 Oct 2022 11:42) (142/81 - 163/82)  BP(mean): --  RR: 18 (03 Oct 2022 11:42) (17 - 18)  SpO2: 95% (03 Oct 2022 11:42) (95% - 98%)    Parameters below as of 03 Oct 2022 11:42  Patient On (Oxygen Delivery Method): room air        CONSTITUTIONAL: NAD, well-groomed  EYES:  conjunctiva and sclera clear  ENMT: Moist oral mucosa  NECK: Supple, no palpable masses; no JVD  RESPIRATORY: Normal respiratory effort; lungs are clear to auscultation bilaterally  CARDIOVASCULAR: Regular rate and rhythm, normal S1 and S2, no murmur/rub/gallop; No lower extremity edema  ABDOMEN: Nontender to palpation, normoactive bowel sounds, no rebound/guarding  MUSCULOSKELETAL:  no clubbing or cyanosis of digits; no joint swelling or tenderness to palpation  PSYCH: A+O to person, place, and time; affect appropriate  SKIN: No rashes; no palpable lesions    LABS:                        12.7   9.29  )-----------( 358      ( 02 Oct 2022 01:31 )             36.6     10-03    136  |  99  |  11  ----------------------------<  144<H>  4.0   |  24  |  0.90    Ca    8.7      03 Oct 2022 06:49  Phos  2.6     10-03  Mg     2.2     10-03                Culture - Urine (collected 01 Oct 2022 04:36)  Source: Clean Catch Clean Catch (Midstream)  Final Report (02 Oct 2022 11:49):    <10,000 CFU/mL Normal Urogenital Juliana        RADIOLOGY & ADDITIONAL TESTS:  Results Reviewed:   Imaging Personally Reviewed:  Electrocardiogram Personally Reviewed:    COORDINATION OF CARE:  Care Discussed with Consultants/Other Providers [Y/N]:  Prior or Outpatient Records Reviewed [Y/N]:

## 2022-10-04 NOTE — PRE-ANESTHESIA EVALUATION ADULT - NSANTHOSAYNRD_GEN_A_CORE
No. ADRI screening performed.  STOP BANG Legend: 0-2 = LOW Risk; 3-4 = INTERMEDIATE Risk; 5-8 = HIGH Risk

## 2022-10-04 NOTE — PROGRESS NOTE ADULT - PROBLEM SELECTOR PLAN 1
Patient again c/o wallowing difficulty with breakfast and lunch today.   Given persistent symptom will need further GI and S+S eval.   -will continue to monitor  - on a regular diet but NPO past MN
-suspect sxs d/t medication intolerance vs anxiety  -pt was able to swallow water without any issues  -given that she has tolerated cipro in the past, do not think this is an allergic reaction to cipro. Could have been the combination with the other drugs while taking them on an empty stomach  -pt has improved since coming to the hospital without any intervention  -will continue to monitor  -will start on a pureed diet and can advance as tolerated  -pt can f/u with ENT as outpt  -c/w antiemetics prn
-suspect sxs d/t medication intolerance vs anxiety  -pt was able to swallow water without any issues  -given that she has tolerated cipro in the past, do not think this is an allergic reaction to cipro. Could have been the combination with the other drugs while taking them on an empty stomach  -pt has improved since coming to the hospital without any intervention  -will continue to monitor  -now on a regular diet   -pt can f/u with ENT as outpt  -c/w antiemetics prn
c/o swallowing difficulty with breakfast and lunch  on 10/3  Given persistent symptom will need further GI  eval  -s/p EGD 10/4 showing hiatal hernia with esophageal ulcer.   PPI BID. f/u biopsy

## 2022-10-04 NOTE — DISCHARGE NOTE NURSING/CASE MANAGEMENT/SOCIAL WORK - NSDCPEFALRISK_GEN_ALL_CORE
For information on Fall & Injury Prevention, visit: [email or web address] OR  [email or web address] OR  https://www.cdc.gov/steadi/patient.html

## 2022-10-20 PROCEDURE — 99285 EMERGENCY DEPT VISIT HI MDM: CPT

## 2022-10-20 PROCEDURE — 36415 COLL VENOUS BLD VENIPUNCTURE: CPT

## 2022-10-20 PROCEDURE — 94640 AIRWAY INHALATION TREATMENT: CPT

## 2022-10-20 PROCEDURE — 96375 TX/PRO/DX INJ NEW DRUG ADDON: CPT

## 2022-10-20 PROCEDURE — 87637 SARSCOV2&INF A&B&RSV AMP PRB: CPT

## 2022-10-20 PROCEDURE — 84550 ASSAY OF BLOOD/URIC ACID: CPT

## 2022-10-20 PROCEDURE — 80048 BASIC METABOLIC PNL TOTAL CA: CPT

## 2022-10-20 PROCEDURE — 82436 ASSAY OF URINE CHLORIDE: CPT

## 2022-10-20 PROCEDURE — 83935 ASSAY OF URINE OSMOLALITY: CPT

## 2022-10-20 PROCEDURE — 83735 ASSAY OF MAGNESIUM: CPT

## 2022-10-20 PROCEDURE — 81003 URINALYSIS AUTO W/O SCOPE: CPT

## 2022-10-20 PROCEDURE — 83690 ASSAY OF LIPASE: CPT

## 2022-10-20 PROCEDURE — 83930 ASSAY OF BLOOD OSMOLALITY: CPT

## 2022-10-20 PROCEDURE — 84156 ASSAY OF PROTEIN URINE: CPT

## 2022-10-20 PROCEDURE — 80053 COMPREHEN METABOLIC PANEL: CPT

## 2022-10-20 PROCEDURE — 88305 TISSUE EXAM BY PATHOLOGIST: CPT

## 2022-10-20 PROCEDURE — 96374 THER/PROPH/DIAG INJ IV PUSH: CPT

## 2022-10-20 PROCEDURE — 84100 ASSAY OF PHOSPHORUS: CPT

## 2022-10-20 PROCEDURE — 85027 COMPLETE CBC AUTOMATED: CPT

## 2022-10-20 PROCEDURE — 85025 COMPLETE CBC W/AUTO DIFF WBC: CPT

## 2022-10-20 PROCEDURE — 97161 PT EVAL LOW COMPLEX 20 MIN: CPT

## 2022-10-20 PROCEDURE — 84443 ASSAY THYROID STIM HORMONE: CPT

## 2022-10-20 PROCEDURE — 84300 ASSAY OF URINE SODIUM: CPT

## 2022-10-20 PROCEDURE — 87086 URINE CULTURE/COLONY COUNT: CPT

## 2023-02-19 NOTE — DISCHARGE NOTE NURSING/CASE MANAGEMENT/SOCIAL WORK - NSFLUVACAGEDISCH_IMM_ALL_CORE
SHIFT SUMMARY
 
PT HAS NOT HAD A BM FOR 4 DAYS, BOWEL CARE ONGOING SINCE YESTERDAY WITH NO
RESULTS. MD PROVIDED MORE ORDERS FOR MOM & MAG CITRATE AS WELL AS SENOKOT,
COLACE & MIRALAX. PT HAS BEEN UP & AMBULATING HALLS. IS ON RA WITH SATS
MAINTAINING >94%. WEARS C-PAP AT [facility] WITH BLEED IN OF 2 L'S O2. CONT BI-OX IS
WORN AT [facility]. PT IS A&O X 4, INDEPENDENT, IS PLEASANT & COOPERATIVE WITH ALL
CARE. Adult

## 2023-03-01 ENCOUNTER — OFFICE (OUTPATIENT)
Dept: URBAN - METROPOLITAN AREA CLINIC 27 | Facility: CLINIC | Age: 83
Setting detail: OPHTHALMOLOGY
End: 2023-03-01
Payer: MEDICARE

## 2023-03-01 DIAGNOSIS — H35.373: ICD-10-CM

## 2023-03-01 DIAGNOSIS — H01.001: ICD-10-CM

## 2023-03-01 DIAGNOSIS — H18.413: ICD-10-CM

## 2023-03-01 DIAGNOSIS — D22.122: ICD-10-CM

## 2023-03-01 DIAGNOSIS — H26.493: ICD-10-CM

## 2023-03-01 DIAGNOSIS — H16.223: ICD-10-CM

## 2023-03-01 PROCEDURE — 92014 COMPRE OPH EXAM EST PT 1/>: CPT | Performed by: OPHTHALMOLOGY

## 2023-03-01 PROCEDURE — 92250 FUNDUS PHOTOGRAPHY W/I&R: CPT | Performed by: OPHTHALMOLOGY

## 2023-03-01 ASSESSMENT — REFRACTION_CURRENTRX
OS_ADD: +2.75
OD_SPHERE: PLANO
OD_AXIS: 167
OS_CYLINDER: 0.00
OS_OVR_VA: 20/
OS_SPHERE: --0.25
OS_AXIS: 0
OS_VPRISM_DIRECTION: PROGS
OD_CYLINDER: +0.50
OD_VPRISM_DIRECTION: PROGS
OD_OVR_VA: 20/
OD_ADD: +2.75

## 2023-03-01 ASSESSMENT — LID EXAM ASSESSMENTS
OD_BLEPHARITIS: RUL T
OS_BLEPHARITIS: LUL T
OS_MEIBOMITIS: LUL 1+ 2+
OD_MEIBOMITIS: RUL 1+ 2+
OS_COMMENTS: NEVUS LLL

## 2023-03-01 ASSESSMENT — CONFRONTATIONAL VISUAL FIELD TEST (CVF)
OD_FINDINGS: FULL
OS_FINDINGS: FULL

## 2023-03-01 ASSESSMENT — SUPERFICIAL PUNCTATE KERATITIS (SPK)
OD_SPK: 1+ 2+
OS_SPK: 1+

## 2023-03-01 ASSESSMENT — TONOMETRY
OS_IOP_MMHG: 13
OD_IOP_MMHG: 19

## 2023-03-01 ASSESSMENT — AXIALLENGTH_DERIVED
OS_AL: 24.066
OD_AL: 23.6239

## 2023-03-01 ASSESSMENT — KERATOMETRY
OD_K1POWER_DIOPTERS: 41.75
OD_K2POWER_DIOPTERS: 43.75
OS_AXISANGLE_DEGREES: 063
OD_AXISANGLE_DEGREES: 081
METHOD_AUTO_MANUAL: AUTO
OS_K2POWER_DIOPTERS: 43.00
OS_K1POWER_DIOPTERS: 42.00

## 2023-03-01 ASSESSMENT — VISUAL ACUITY
OS_BCVA: 20/20
OD_BCVA: 20/20

## 2023-03-01 ASSESSMENT — SPHEQUIV_DERIVED
OS_SPHEQUIV: -0.25
OD_SPHEQUIV: 0.625

## 2023-03-01 ASSESSMENT — REFRACTION_AUTOREFRACTION
OS_SPHERE: -0.50
OS_AXIS: 092
OS_CYLINDER: +0.50
OD_SPHERE: +0.25
OD_CYLINDER: +0.75
OD_AXIS: 162

## 2023-08-17 NOTE — ED ADULT TRIAGE NOTE - HEIGHT IN INCHES
86yo F PMHx HTN, breast cancer and lymphoma s/p surgery in 2004, glaucoma, presenting for worsening dizziness and unsteadiness over the past week.       # Dizziness and Unsteadiness 2/2 Orthostatic Hypotension  # Bilateral lower limb weakness   - Positive orthostatics  - Labs unremarkable  - CT head showed: Mild progression of cerebral atrophy. Mild progression of periventricular and internal and external capsule hypodensity likely representing ischemic change, age indeterminant.  - MR Head No Cont No evidence of acute infarct. Moderate chronic microvascular ischemic changes.  - Neurology evaluated her, their impression is orthostatics as brain imaging is negative and the dizziness is worse with movement   - PT recommended Rehab facility   - TSH, folate, B12 normal  - started on meclizine 25 mg X3   - f/u duplex     # Positive UA  - dysuria  - continue Cipro 500 mg BID   - WBC 12K  - Urine culture negative    # HTN   - Lower metoprolol to 25 mg once daily at nighttime  - c/w Nifedipine in the morning    # GERD   - continue famotidine       # DVT prophylaxis: Lovenox  # GI prophylaxis: Famotidine  # Diet: DASH/TLC  # Activity: as tolerated  # Code status: Full Code  # Disposition: short term rehab in SNF    Pending: f/u placement of short term rehab in SNF 0

## 2024-12-02 ENCOUNTER — OFFICE (OUTPATIENT)
Dept: URBAN - METROPOLITAN AREA CLINIC 27 | Facility: CLINIC | Age: 84
Setting detail: OPHTHALMOLOGY
End: 2024-12-02
Payer: MEDICARE

## 2024-12-02 DIAGNOSIS — D22.122: ICD-10-CM

## 2024-12-02 DIAGNOSIS — H18.413: ICD-10-CM

## 2024-12-02 DIAGNOSIS — H01.001: ICD-10-CM

## 2024-12-02 DIAGNOSIS — H26.493: ICD-10-CM

## 2024-12-02 DIAGNOSIS — H11.823: ICD-10-CM

## 2024-12-02 DIAGNOSIS — H35.373: ICD-10-CM

## 2024-12-02 DIAGNOSIS — H16.223: ICD-10-CM

## 2024-12-02 PROCEDURE — 92134 CPTRZ OPH DX IMG PST SGM RTA: CPT | Performed by: OPHTHALMOLOGY

## 2024-12-02 PROCEDURE — 92014 COMPRE OPH EXAM EST PT 1/>: CPT | Performed by: OPHTHALMOLOGY

## 2024-12-02 ASSESSMENT — REFRACTION_CURRENTRX
OD_SPHERE: PLANO
OS_AXIS: 0
OD_VPRISM_DIRECTION: PROGS
OS_CYLINDER: 0.00
OD_CYLINDER: +0.50
OS_OVR_VA: 20/
OS_VPRISM_DIRECTION: PROGS
OS_ADD: +2.75
OD_AXIS: 167
OD_ADD: +2.75
OS_SPHERE: --0.25
OD_OVR_VA: 20/

## 2024-12-02 ASSESSMENT — VISUAL ACUITY
OS_BCVA: 20/25-1
OD_BCVA: 20/20

## 2024-12-02 ASSESSMENT — CONFRONTATIONAL VISUAL FIELD TEST (CVF)
OD_FINDINGS: FULL
OS_FINDINGS: FULL

## 2024-12-02 ASSESSMENT — SUPERFICIAL PUNCTATE KERATITIS (SPK)
OS_SPK: 1+
OD_SPK: 2+

## 2024-12-02 ASSESSMENT — REFRACTION_AUTOREFRACTION
OD_CYLINDER: +0.25
OS_SPHERE: -0.75
OD_AXIS: 74
OD_SPHERE: -0.25
OS_CYLINDER: +0.75
OS_AXIS: 76

## 2024-12-02 ASSESSMENT — LID EXAM ASSESSMENTS
OS_MEIBOMITIS: LUL 1+ 2+
OS_COMMENTS: NEVUS LLL
OD_BLEPHARITIS: RUL T
OD_MEIBOMITIS: RUL 1+ 2+
OS_BLEPHARITIS: LUL T

## 2024-12-02 ASSESSMENT — KERATOMETRY
METHOD_AUTO_MANUAL: AUTO
OD_K1POWER_DIOPTERS: 42.00
OS_K2POWER_DIOPTERS: 43.00
OD_AXISANGLE_DEGREES: 84
OS_AXISANGLE_DEGREES: 70
OD_K2POWER_DIOPTERS: 44.50
OS_K1POWER_DIOPTERS: 42.00

## 2024-12-02 ASSESSMENT — TONOMETRY
OD_IOP_MMHG: 17
OS_IOP_MMHG: 14

## 2024-12-02 ASSESSMENT — DRY EYES - PHYSICIAN NOTES
OS_GENERALCOMMENTS: INFERIOR
OD_GENERALCOMMENTS: INFERIOR

## 2024-12-20 NOTE — DISCHARGE NOTE PROVIDER - NSDCQMAMI_CARD_ALL_CORE
Called Patient to advise no status on forms have been provided to forms dept by MD office. Sent message to MD clinical staff NO response - MD staff has not provided us forms or has informed us any information on forms status/completion. Recommend patient to call MD office to ask if MD completed forms or if they can send forms need for her claim to our dept so we can process ASAP. Also mentioned to patient to see if she can get new forms from employer/insurance company for we can process but to f/u with MD staff on forms status since she did mention sensitive information was added to her original forms she submitted in office. - Verbal understanding from patient    No

## 2025-03-07 NOTE — H&P ADULT - NSHPREVIEWOFSYSTEMS_GEN_ALL_CORE
Myself GEN: no night sweats or change in appetite  EYES: no changes in vision or diplopia   ENT: no epistaxis, sinus pain, gingival bleeding, odynophagia +dysphagia  CV: no CP, PND or palpitations  RESP: no cough, wheezing, or hemoptysis  GI: no hematemesis, hematochezia, or melena  : no dysuria, polyuria, or hematuria  MSK: no arthralgias or joint swelling   NEURO: no gross sensory changes, numbness, focal deficits  PSYCH: no depression or changes in concentration  HEME/ONC: no purpura, petechiae or night sweats  SKIN: no pruritus, hair loss or skin lesions

## (undated) DEVICE — CATH IV SAFE BC 20G X 1.16" (PINK)

## (undated) DEVICE — TUBING SUCTION CONN 6FT STERILE

## (undated) DEVICE — PACK IV START WITH CHG

## (undated) DEVICE — SYR ALLIANCE II INFLATION 60ML

## (undated) DEVICE — SENSOR O2 FINGER ADULT

## (undated) DEVICE — BITE BLOCK ADULT 20 X 27MM (GREEN)

## (undated) DEVICE — BIOPSY FORCEP RADIAL JAW 4 STANDARD WITH NEEDLE

## (undated) DEVICE — BALLOON US ENDO

## (undated) DEVICE — SOL INJ NS 0.9% 500ML 2 PORT

## (undated) DEVICE — CATH IV SAFE BC 22G X 1" (BLUE)

## (undated) DEVICE — FOLEY HOLDER STATLOCK 2 WAY ADULT

## (undated) DEVICE — SUCTION YANKAUER NO CONTROL VENT

## (undated) DEVICE — TUBING IV SET GRAVITY 3Y 100" MACRO

## (undated) DEVICE — TUBING SUCTION 20FT